# Patient Record
Sex: FEMALE | Race: NATIVE HAWAIIAN OR OTHER PACIFIC ISLANDER
[De-identification: names, ages, dates, MRNs, and addresses within clinical notes are randomized per-mention and may not be internally consistent; named-entity substitution may affect disease eponyms.]

---

## 2017-10-14 ENCOUNTER — HOSPITAL ENCOUNTER (INPATIENT)
Dept: HOSPITAL 67 - ED | Age: 72
LOS: 6 days | Discharge: NURSING, REHAB OR LONG-TERM CARE | DRG: 637 | End: 2017-10-20
Attending: GENERAL PRACTICE | Admitting: GENERAL PRACTICE
Payer: COMMERCIAL

## 2017-10-14 VITALS — DIASTOLIC BLOOD PRESSURE: 77 MMHG | SYSTOLIC BLOOD PRESSURE: 121 MMHG

## 2017-10-14 VITALS — TEMPERATURE: 98 F | SYSTOLIC BLOOD PRESSURE: 117 MMHG | DIASTOLIC BLOOD PRESSURE: 75 MMHG

## 2017-10-14 VITALS — DIASTOLIC BLOOD PRESSURE: 71 MMHG | SYSTOLIC BLOOD PRESSURE: 139 MMHG

## 2017-10-14 VITALS — DIASTOLIC BLOOD PRESSURE: 79 MMHG | SYSTOLIC BLOOD PRESSURE: 158 MMHG

## 2017-10-14 VITALS
WEIGHT: 82.56 LBS | HEIGHT: 60 IN | BODY MASS INDEX: 16.21 KG/M2 | BODY MASS INDEX: 16.21 KG/M2 | WEIGHT: 82.56 LBS | HEIGHT: 60 IN

## 2017-10-14 VITALS — DIASTOLIC BLOOD PRESSURE: 80 MMHG | TEMPERATURE: 97.8 F | SYSTOLIC BLOOD PRESSURE: 143 MMHG

## 2017-10-14 VITALS — SYSTOLIC BLOOD PRESSURE: 132 MMHG | TEMPERATURE: 98.7 F | DIASTOLIC BLOOD PRESSURE: 79 MMHG

## 2017-10-14 VITALS — SYSTOLIC BLOOD PRESSURE: 149 MMHG | DIASTOLIC BLOOD PRESSURE: 76 MMHG

## 2017-10-14 VITALS — SYSTOLIC BLOOD PRESSURE: 139 MMHG | DIASTOLIC BLOOD PRESSURE: 81 MMHG

## 2017-10-14 VITALS — DIASTOLIC BLOOD PRESSURE: 75 MMHG | SYSTOLIC BLOOD PRESSURE: 137 MMHG

## 2017-10-14 VITALS — DIASTOLIC BLOOD PRESSURE: 75 MMHG | SYSTOLIC BLOOD PRESSURE: 127 MMHG

## 2017-10-14 VITALS — DIASTOLIC BLOOD PRESSURE: 75 MMHG | SYSTOLIC BLOOD PRESSURE: 129 MMHG

## 2017-10-14 VITALS — DIASTOLIC BLOOD PRESSURE: 76 MMHG | TEMPERATURE: 97.7 F | SYSTOLIC BLOOD PRESSURE: 157 MMHG

## 2017-10-14 VITALS — DIASTOLIC BLOOD PRESSURE: 84 MMHG | SYSTOLIC BLOOD PRESSURE: 160 MMHG

## 2017-10-14 VITALS — DIASTOLIC BLOOD PRESSURE: 80 MMHG | SYSTOLIC BLOOD PRESSURE: 141 MMHG

## 2017-10-14 DIAGNOSIS — E11.10: Primary | ICD-10-CM

## 2017-10-14 DIAGNOSIS — E87.1: ICD-10-CM

## 2017-10-14 DIAGNOSIS — R06.09: ICD-10-CM

## 2017-10-14 DIAGNOSIS — D72.828: ICD-10-CM

## 2017-10-14 DIAGNOSIS — E87.2: ICD-10-CM

## 2017-10-14 DIAGNOSIS — Z72.0: ICD-10-CM

## 2017-10-14 DIAGNOSIS — E83.51: ICD-10-CM

## 2017-10-14 DIAGNOSIS — B96.1: ICD-10-CM

## 2017-10-14 DIAGNOSIS — N39.0: ICD-10-CM

## 2017-10-14 DIAGNOSIS — L89.893: ICD-10-CM

## 2017-10-14 DIAGNOSIS — F41.8: ICD-10-CM

## 2017-10-14 DIAGNOSIS — J44.9: ICD-10-CM

## 2017-10-14 DIAGNOSIS — I10: ICD-10-CM

## 2017-10-14 DIAGNOSIS — E83.42: ICD-10-CM

## 2017-10-14 LAB
PLATELET # BLD: 286 K/UL (ref 152–353)
POTASSIUM SERPL-SCNC: 3.7 MMOL/L (ref 3.6–5.2)
POTASSIUM SERPL-SCNC: 4.5 MMOL/L (ref 3.6–5.2)
POTASSIUM SERPL-SCNC: 4.7 MMOL/L (ref 3.6–5.2)
SODIUM SERPL-SCNC: 117 MMOL/L (ref 136–145)
SODIUM SERPL-SCNC: 121 MMOL/L (ref 136–145)
SODIUM SERPL-SCNC: 126 MMOL/L (ref 136–145)

## 2017-10-14 PROCEDURE — 96372 THER/PROPH/DIAG INJ SC/IM: CPT

## 2017-10-14 PROCEDURE — 80053 COMPREHEN METABOLIC PANEL: CPT

## 2017-10-14 PROCEDURE — 85379 FIBRIN DEGRADATION QUANT: CPT

## 2017-10-14 PROCEDURE — 82805 BLOOD GASES W/O2 SATURATION: CPT

## 2017-10-14 PROCEDURE — 85027 COMPLETE CBC AUTOMATED: CPT

## 2017-10-14 PROCEDURE — 81002 URINALYSIS NONAUTO W/O SCOPE: CPT

## 2017-10-14 PROCEDURE — 87205 SMEAR GRAM STAIN: CPT

## 2017-10-14 PROCEDURE — 82947 ASSAY GLUCOSE BLOOD QUANT: CPT

## 2017-10-14 PROCEDURE — 36416 COLLJ CAPILLARY BLOOD SPEC: CPT

## 2017-10-14 PROCEDURE — 87185 SC STD ENZYME DETCJ PER NZM: CPT

## 2017-10-14 PROCEDURE — 36600 WITHDRAWAL OF ARTERIAL BLOOD: CPT

## 2017-10-14 PROCEDURE — 82570 ASSAY OF URINE CREATININE: CPT

## 2017-10-14 PROCEDURE — 36415 COLL VENOUS BLD VENIPUNCTURE: CPT

## 2017-10-14 PROCEDURE — 84300 ASSAY OF URINE SODIUM: CPT

## 2017-10-14 PROCEDURE — 84481 FREE ASSAY (FT-3): CPT

## 2017-10-14 PROCEDURE — 87186 SC STD MICRODIL/AGAR DIL: CPT

## 2017-10-14 PROCEDURE — 87070 CULTURE OTHR SPECIMN AEROBIC: CPT

## 2017-10-14 PROCEDURE — 83605 ASSAY OF LACTIC ACID: CPT

## 2017-10-14 PROCEDURE — 84443 ASSAY THYROID STIM HORMONE: CPT

## 2017-10-14 PROCEDURE — 80048 BASIC METABOLIC PNL TOTAL CA: CPT

## 2017-10-14 PROCEDURE — 99282 EMERGENCY DEPT VISIT SF MDM: CPT

## 2017-10-14 PROCEDURE — 87088 URINE BACTERIA CULTURE: CPT

## 2017-10-14 PROCEDURE — 82962 GLUCOSE BLOOD TEST: CPT

## 2017-10-14 PROCEDURE — 83880 ASSAY OF NATRIURETIC PEPTIDE: CPT

## 2017-10-14 PROCEDURE — 81000 URINALYSIS NONAUTO W/SCOPE: CPT

## 2017-10-14 PROCEDURE — 82948 REAGENT STRIP/BLOOD GLUCOSE: CPT

## 2017-10-14 PROCEDURE — 87086 URINE CULTURE/COLONY COUNT: CPT

## 2017-10-14 PROCEDURE — 87077 CULTURE AEROBIC IDENTIFY: CPT

## 2017-10-14 PROCEDURE — 84439 ASSAY OF FREE THYROXINE: CPT

## 2017-10-14 PROCEDURE — 83735 ASSAY OF MAGNESIUM: CPT

## 2017-10-15 VITALS — DIASTOLIC BLOOD PRESSURE: 70 MMHG | SYSTOLIC BLOOD PRESSURE: 149 MMHG | TEMPERATURE: 97.1 F

## 2017-10-15 VITALS — DIASTOLIC BLOOD PRESSURE: 76 MMHG | SYSTOLIC BLOOD PRESSURE: 141 MMHG

## 2017-10-15 VITALS — DIASTOLIC BLOOD PRESSURE: 54 MMHG | SYSTOLIC BLOOD PRESSURE: 177 MMHG

## 2017-10-15 VITALS — TEMPERATURE: 97.8 F | DIASTOLIC BLOOD PRESSURE: 72 MMHG | SYSTOLIC BLOOD PRESSURE: 150 MMHG

## 2017-10-15 VITALS — SYSTOLIC BLOOD PRESSURE: 136 MMHG | DIASTOLIC BLOOD PRESSURE: 66 MMHG

## 2017-10-15 VITALS — SYSTOLIC BLOOD PRESSURE: 140 MMHG | DIASTOLIC BLOOD PRESSURE: 69 MMHG

## 2017-10-15 VITALS — SYSTOLIC BLOOD PRESSURE: 130 MMHG | DIASTOLIC BLOOD PRESSURE: 76 MMHG

## 2017-10-15 VITALS — DIASTOLIC BLOOD PRESSURE: 68 MMHG | TEMPERATURE: 98.9 F | SYSTOLIC BLOOD PRESSURE: 122 MMHG

## 2017-10-15 VITALS — TEMPERATURE: 96.3 F | DIASTOLIC BLOOD PRESSURE: 68 MMHG | SYSTOLIC BLOOD PRESSURE: 140 MMHG

## 2017-10-15 VITALS — SYSTOLIC BLOOD PRESSURE: 126 MMHG | DIASTOLIC BLOOD PRESSURE: 66 MMHG | TEMPERATURE: 96.9 F

## 2017-10-15 VITALS — SYSTOLIC BLOOD PRESSURE: 140 MMHG | DIASTOLIC BLOOD PRESSURE: 71 MMHG

## 2017-10-15 VITALS — DIASTOLIC BLOOD PRESSURE: 71 MMHG | SYSTOLIC BLOOD PRESSURE: 119 MMHG | TEMPERATURE: 98.3 F

## 2017-10-15 VITALS — DIASTOLIC BLOOD PRESSURE: 56 MMHG | SYSTOLIC BLOOD PRESSURE: 132 MMHG

## 2017-10-15 VITALS — DIASTOLIC BLOOD PRESSURE: 69 MMHG | SYSTOLIC BLOOD PRESSURE: 129 MMHG

## 2017-10-15 VITALS — SYSTOLIC BLOOD PRESSURE: 143 MMHG | DIASTOLIC BLOOD PRESSURE: 77 MMHG

## 2017-10-15 VITALS — DIASTOLIC BLOOD PRESSURE: 68 MMHG | SYSTOLIC BLOOD PRESSURE: 150 MMHG

## 2017-10-15 VITALS — SYSTOLIC BLOOD PRESSURE: 114 MMHG | DIASTOLIC BLOOD PRESSURE: 64 MMHG

## 2017-10-15 VITALS — SYSTOLIC BLOOD PRESSURE: 140 MMHG | DIASTOLIC BLOOD PRESSURE: 68 MMHG

## 2017-10-15 VITALS — SYSTOLIC BLOOD PRESSURE: 105 MMHG | DIASTOLIC BLOOD PRESSURE: 58 MMHG | TEMPERATURE: 96.9 F

## 2017-10-15 VITALS — DIASTOLIC BLOOD PRESSURE: 72 MMHG | SYSTOLIC BLOOD PRESSURE: 152 MMHG

## 2017-10-15 VITALS — SYSTOLIC BLOOD PRESSURE: 142 MMHG | DIASTOLIC BLOOD PRESSURE: 69 MMHG

## 2017-10-15 VITALS — SYSTOLIC BLOOD PRESSURE: 140 MMHG | DIASTOLIC BLOOD PRESSURE: 88 MMHG

## 2017-10-15 VITALS — SYSTOLIC BLOOD PRESSURE: 139 MMHG | DIASTOLIC BLOOD PRESSURE: 65 MMHG

## 2017-10-15 VITALS — DIASTOLIC BLOOD PRESSURE: 66 MMHG | SYSTOLIC BLOOD PRESSURE: 119 MMHG

## 2017-10-15 LAB
PLATELET # BLD: 311 K/UL (ref 152–353)
POTASSIUM SERPL-SCNC: 3.5 MMOL/L (ref 3.6–5.2)
POTASSIUM SERPL-SCNC: 3.7 MMOL/L (ref 3.6–5.2)
POTASSIUM SERPL-SCNC: 3.7 MMOL/L (ref 3.6–5.2)
SODIUM SERPL-SCNC: 127 MMOL/L (ref 136–145)
SODIUM SERPL-SCNC: 128 MMOL/L (ref 136–145)
SODIUM SERPL-SCNC: 129 MMOL/L (ref 136–145)

## 2017-10-16 VITALS — DIASTOLIC BLOOD PRESSURE: 85 MMHG | SYSTOLIC BLOOD PRESSURE: 176 MMHG

## 2017-10-16 VITALS — TEMPERATURE: 98.1 F | DIASTOLIC BLOOD PRESSURE: 73 MMHG | SYSTOLIC BLOOD PRESSURE: 157 MMHG

## 2017-10-16 VITALS — SYSTOLIC BLOOD PRESSURE: 166 MMHG | DIASTOLIC BLOOD PRESSURE: 86 MMHG | TEMPERATURE: 98 F

## 2017-10-16 VITALS — TEMPERATURE: 98.3 F | DIASTOLIC BLOOD PRESSURE: 75 MMHG | SYSTOLIC BLOOD PRESSURE: 155 MMHG

## 2017-10-16 VITALS — DIASTOLIC BLOOD PRESSURE: 78 MMHG | SYSTOLIC BLOOD PRESSURE: 170 MMHG

## 2017-10-16 VITALS — DIASTOLIC BLOOD PRESSURE: 74 MMHG | SYSTOLIC BLOOD PRESSURE: 155 MMHG

## 2017-10-16 VITALS — DIASTOLIC BLOOD PRESSURE: 95 MMHG | SYSTOLIC BLOOD PRESSURE: 181 MMHG | TEMPERATURE: 97.9 F

## 2017-10-16 VITALS — DIASTOLIC BLOOD PRESSURE: 81 MMHG | SYSTOLIC BLOOD PRESSURE: 167 MMHG

## 2017-10-16 VITALS — SYSTOLIC BLOOD PRESSURE: 157 MMHG | DIASTOLIC BLOOD PRESSURE: 76 MMHG | TEMPERATURE: 97.6 F

## 2017-10-16 VITALS — SYSTOLIC BLOOD PRESSURE: 156 MMHG | DIASTOLIC BLOOD PRESSURE: 72 MMHG

## 2017-10-16 LAB
PLATELET # BLD: 188 K/UL (ref 152–353)
POTASSIUM SERPL-SCNC: 3.1 MMOL/L (ref 3.6–5.2)
SODIUM SERPL-SCNC: 130 MMOL/L (ref 136–145)

## 2017-10-17 VITALS — TEMPERATURE: 97.8 F | DIASTOLIC BLOOD PRESSURE: 64 MMHG | SYSTOLIC BLOOD PRESSURE: 133 MMHG

## 2017-10-17 VITALS — DIASTOLIC BLOOD PRESSURE: 85 MMHG | SYSTOLIC BLOOD PRESSURE: 172 MMHG | TEMPERATURE: 98 F

## 2017-10-17 VITALS — DIASTOLIC BLOOD PRESSURE: 58 MMHG | TEMPERATURE: 97.8 F | SYSTOLIC BLOOD PRESSURE: 152 MMHG

## 2017-10-17 LAB
PLATELET # BLD: 270 K/UL (ref 152–353)
POTASSIUM SERPL-SCNC: 3.1 MMOL/L (ref 3.6–5.2)
SODIUM SERPL-SCNC: 128 MMOL/L (ref 136–145)

## 2017-10-18 VITALS — TEMPERATURE: 98 F | SYSTOLIC BLOOD PRESSURE: 140 MMHG | DIASTOLIC BLOOD PRESSURE: 71 MMHG

## 2017-10-18 VITALS — TEMPERATURE: 98.7 F | DIASTOLIC BLOOD PRESSURE: 61 MMHG | SYSTOLIC BLOOD PRESSURE: 132 MMHG

## 2017-10-18 LAB
PLATELET # BLD: 267 K/UL (ref 152–353)
POTASSIUM SERPL-SCNC: 3.5 MMOL/L (ref 3.6–5.2)
SODIUM SERPL-SCNC: 131 MMOL/L (ref 136–145)

## 2017-10-19 VITALS — TEMPERATURE: 98.9 F | SYSTOLIC BLOOD PRESSURE: 146 MMHG | DIASTOLIC BLOOD PRESSURE: 73 MMHG

## 2017-10-19 VITALS — TEMPERATURE: 99.2 F | DIASTOLIC BLOOD PRESSURE: 65 MMHG | SYSTOLIC BLOOD PRESSURE: 129 MMHG

## 2017-10-19 VITALS — TEMPERATURE: 98.4 F | SYSTOLIC BLOOD PRESSURE: 146 MMHG | DIASTOLIC BLOOD PRESSURE: 74 MMHG

## 2017-10-19 VITALS — TEMPERATURE: 98.7 F | SYSTOLIC BLOOD PRESSURE: 146 MMHG | DIASTOLIC BLOOD PRESSURE: 66 MMHG

## 2017-10-19 VITALS — TEMPERATURE: 99 F | SYSTOLIC BLOOD PRESSURE: 142 MMHG | DIASTOLIC BLOOD PRESSURE: 66 MMHG

## 2017-10-19 VITALS — DIASTOLIC BLOOD PRESSURE: 65 MMHG | TEMPERATURE: 98.9 F | SYSTOLIC BLOOD PRESSURE: 139 MMHG

## 2017-10-19 LAB
PLATELET # BLD: 231 K/UL (ref 152–353)
POTASSIUM SERPL-SCNC: 3.9 MMOL/L (ref 3.6–5.2)
SODIUM SERPL-SCNC: 132 MMOL/L (ref 136–145)

## 2017-10-20 VITALS — SYSTOLIC BLOOD PRESSURE: 128 MMHG | TEMPERATURE: 98.5 F | DIASTOLIC BLOOD PRESSURE: 63 MMHG

## 2017-10-20 VITALS — SYSTOLIC BLOOD PRESSURE: 151 MMHG | DIASTOLIC BLOOD PRESSURE: 82 MMHG | TEMPERATURE: 98.7 F

## 2017-10-20 VITALS — SYSTOLIC BLOOD PRESSURE: 129 MMHG | DIASTOLIC BLOOD PRESSURE: 73 MMHG | TEMPERATURE: 99.4 F

## 2017-10-20 LAB
PLATELET # BLD: 220 K/UL (ref 152–353)
POTASSIUM SERPL-SCNC: 3.6 MMOL/L (ref 3.6–5.2)
SODIUM SERPL-SCNC: 133 MMOL/L (ref 136–145)

## 2017-10-29 ENCOUNTER — HOSPITAL ENCOUNTER (INPATIENT)
Dept: HOSPITAL 24 - ER | Age: 72
LOS: 3 days | Discharge: NURSING, REHAB OR LONG-TERM CARE | DRG: 947 | End: 2017-11-01
Attending: INTERNAL MEDICINE | Admitting: INTERNAL MEDICINE
Payer: COMMERCIAL

## 2017-10-29 VITALS — BODY MASS INDEX: 13.2 KG/M2

## 2017-10-29 DIAGNOSIS — N39.0: ICD-10-CM

## 2017-10-29 DIAGNOSIS — I25.10: ICD-10-CM

## 2017-10-29 DIAGNOSIS — E87.5: ICD-10-CM

## 2017-10-29 DIAGNOSIS — F41.8: ICD-10-CM

## 2017-10-29 DIAGNOSIS — G93.41: ICD-10-CM

## 2017-10-29 DIAGNOSIS — K21.9: ICD-10-CM

## 2017-10-29 DIAGNOSIS — R41.82: Primary | ICD-10-CM

## 2017-10-29 DIAGNOSIS — J44.9: ICD-10-CM

## 2017-10-29 DIAGNOSIS — N17.8: ICD-10-CM

## 2017-10-29 DIAGNOSIS — R94.31: ICD-10-CM

## 2017-10-29 DIAGNOSIS — E87.1: ICD-10-CM

## 2017-10-29 DIAGNOSIS — R53.1: ICD-10-CM

## 2017-10-29 DIAGNOSIS — E86.0: ICD-10-CM

## 2017-10-29 DIAGNOSIS — E11.65: ICD-10-CM

## 2017-10-29 DIAGNOSIS — I10: ICD-10-CM

## 2017-10-29 LAB
ALBUMIN SERPL BCP-MCNC: 3.4 G/DL (ref 3.4–5)
ALP SERPL-CCNC: 133 UNITS/L (ref 46–116)
ALT SERPL W P-5'-P-CCNC: 26 UNITS/L (ref 12–78)
AMORPH SED URNS QL MICRO: (no result) /HPF
AST SERPL W P-5'-P-CCNC: 32 UNITS/L (ref 15–37)
BACTERIA URNS QL MICRO: (no result) /HPF
BASOPHILS # BLD AUTO: 0.2 X10^3/UL (ref 0–0.1)
BASOPHILS NFR BLD AUTO: 1.2 % (ref 0.2–1)
BILIRUB UR QL STRIP.AUTO: NEGATIVE
BUN SERPL-MCNC: 56 MG/DL (ref 7–18)
BUN SERPL-MCNC: 58 MG/DL (ref 7–18)
CALCIUM ALBUM COR SERPL-SCNC: (no result) MG/DL (ref 8.5–10.1)
CALCIUM ALBUM COR SERPL-SCNC: (no result) MMOL/L (ref 136–145)
CALCIUM ALBUM COR SERPL-SCNC: (no result) MMOL/L (ref 136–145)
CALCIUM SERPL-MCNC: 10 MG/DL (ref 8.5–10.1)
CALCIUM SERPL-MCNC: 10.1 MG/DL (ref 8.5–10.1)
CHLORIDE SERPL-SCNC: 102 MMOL/L (ref 98–107)
CHLORIDE SERPL-SCNC: 103 MMOL/L (ref 98–107)
CLARITY UR: (no result)
CO2 SERPL-SCNC: 14 MMOL/L (ref 21–32)
CO2 SERPL-SCNC: 15.6 MMOL/L (ref 21–32)
COLOR UR AUTO: YELLOW
CREAT SERPL-MCNC: 3.23 MG/DL (ref 0.55–1.02)
CREAT SERPL-MCNC: 3.33 MG/DL (ref 0.55–1.02)
EGFR  BLACK RACES: 18 (ref 60–?)
EGFR  BLACK RACES: 18 (ref 60–?)
EOSINOPHIL # BLD AUTO: 0 X10^3/UL (ref 0–0.2)
EOSINOPHIL NFR BLD AUTO: 0.2 % (ref 0.9–2.9)
ERYTHROCYTE [DISTWIDTH] IN BLOOD BY AUTOMATED COUNT: 14.7 % (ref 11.6–16.5)
GLUCOSE UR QL STRIP.AUTO: (no result)
HCT VFR BLD AUTO: 39.9 % (ref 36–47)
HGB BLD-MCNC: 13.5 G/DL (ref 12–16)
KETONES UR QL STRIP.AUTO: NEGATIVE
LEUKOCYTE ESTERASE UR QL STRIP.AUTO: (no result)
LYMPHOCYTES # BLD AUTO: 1 X10^3/UL (ref 1.3–2.9)
LYMPHOCYTES NFR BLD AUTO: 6.5 % (ref 21–51)
MCH RBC QN AUTO: 31.7 PG (ref 27–34)
MCHC RBC AUTO-ENTMCNC: 33.9 G/DL (ref 33–35)
MCV RBC AUTO: 93.4 FL (ref 80–100)
MONOCYTES # BLD AUTO: 0.7 X10^3/UL (ref 0.3–0.8)
MONOCYTES NFR BLD AUTO: 4.9 % (ref 0–13)
NEUTROPHILS # BLD AUTO: 12.9 X10^3/UL (ref 2.2–4.8)
NEUTROPHILS NFR BLD AUTO: 87.2 % (ref 42–75)
NITRITE UR QL STRIP.AUTO: NEGATIVE
PH UR STRIP.AUTO: 5 [PH] (ref 5–8)
PLATELET # BLD: 387 X10^3/UL (ref 150–450)
PMV BLD AUTO: 8.7 FL (ref 7.4–11)
PROT SERPL-MCNC: 8.3 G/DL (ref 6.4–8.2)
PROT UR QL STRIP.AUTO: (no result)
RBC # BLD AUTO: 4.27 X10^6/UL (ref 3.5–5.4)
RBC # UR STRIP.AUTO: (no result) /UL
SODIUM SERPL-SCNC: 123 MMOL/L (ref 136–145)
SODIUM SERPL-SCNC: 124 MMOL/L (ref 136–145)
SP GR UR STRIP.AUTO: 1.01 (ref 1–1.03)
SQUAMOUS URNS QL MICRO: (no result) /HPF
UROBILINOGEN UR QL STRIP.AUTO: NORMAL
WBC NRBC COR # BLD AUTO: 14.8 X10^3/UL (ref 3.6–10)
YEAST URNS QL MICRO: (no result) /HPF

## 2017-10-29 PROCEDURE — 96374 THER/PROPH/DIAG INJ IV PUSH: CPT

## 2017-10-29 PROCEDURE — 80053 COMPREHEN METABOLIC PANEL: CPT

## 2017-10-29 PROCEDURE — 99221 1ST HOSP IP/OBS SF/LOW 40: CPT

## 2017-10-29 PROCEDURE — 87040 BLOOD CULTURE FOR BACTERIA: CPT

## 2017-10-29 PROCEDURE — 93005 ELECTROCARDIOGRAM TRACING: CPT

## 2017-10-29 PROCEDURE — 85025 COMPLETE CBC W/AUTO DIFF WBC: CPT

## 2017-10-29 PROCEDURE — 51702 INSERT TEMP BLADDER CATH: CPT

## 2017-10-29 PROCEDURE — 84132 ASSAY OF SERUM POTASSIUM: CPT

## 2017-10-29 PROCEDURE — 36415 COLL VENOUS BLD VENIPUNCTURE: CPT

## 2017-10-29 PROCEDURE — 80200 ASSAY OF TOBRAMYCIN: CPT

## 2017-10-29 PROCEDURE — 96365 THER/PROPH/DIAG IV INF INIT: CPT

## 2017-10-29 PROCEDURE — 82947 ASSAY GLUCOSE BLOOD QUANT: CPT

## 2017-10-29 PROCEDURE — 96375 TX/PRO/DX INJ NEW DRUG ADDON: CPT

## 2017-10-29 PROCEDURE — 87086 URINE CULTURE/COLONY COUNT: CPT

## 2017-10-29 PROCEDURE — 83605 ASSAY OF LACTIC ACID: CPT

## 2017-10-29 PROCEDURE — 93010 ELECTROCARDIOGRAM REPORT: CPT

## 2017-10-29 PROCEDURE — 99285 EMERGENCY DEPT VISIT HI MDM: CPT

## 2017-10-29 PROCEDURE — 99284 EMERGENCY DEPT VISIT MOD MDM: CPT

## 2017-10-29 PROCEDURE — 94640 AIRWAY INHALATION TREATMENT: CPT

## 2017-10-29 PROCEDURE — 71010: CPT

## 2017-10-29 PROCEDURE — 82803 BLOOD GASES ANY COMBINATION: CPT

## 2017-10-29 PROCEDURE — 71020: CPT

## 2017-10-29 PROCEDURE — 82565 ASSAY OF CREATININE: CPT

## 2017-10-29 PROCEDURE — 80048 BASIC METABOLIC PNL TOTAL CA: CPT

## 2017-10-29 PROCEDURE — 36600 WITHDRAWAL OF ARTERIAL BLOOD: CPT

## 2017-10-29 PROCEDURE — 81001 URINALYSIS AUTO W/SCOPE: CPT

## 2017-10-29 RX ADMIN — SODIUM POLYSTYRENE SULFONATE SCH GM: 15 SUSPENSION ORAL; RECTAL at 23:49

## 2017-10-29 RX ADMIN — LEVALBUTEROL HYDROCHLORIDE SCH EA: 1.25 SOLUTION RESPIRATORY (INHALATION) at 20:18

## 2017-10-29 RX ADMIN — SODIUM CHLORIDE SCH: 9 INJECTION, SOLUTION INTRAVENOUS at 14:50

## 2017-10-29 RX ADMIN — TOBRAMYCIN SULFATE SCH MLS/HR: 40 INJECTION, SOLUTION INTRAMUSCULAR; INTRAVENOUS at 18:30

## 2017-10-29 RX ADMIN — SODIUM POLYSTYRENE SULFONATE SCH GM: 15 SUSPENSION ORAL; RECTAL at 18:06

## 2017-10-29 RX ADMIN — BUDESONIDE SCH EA: 0.5 INHALANT RESPIRATORY (INHALATION) at 20:18

## 2017-10-29 RX ADMIN — SODIUM CHLORIDE SCH MLS/HR: 9 INJECTION, SOLUTION INTRAVENOUS at 23:49

## 2017-10-29 RX ADMIN — SODIUM CHLORIDE SCH: 9 INJECTION, SOLUTION INTRAVENOUS at 21:07

## 2017-10-29 NOTE — DR.GENAD
HPI





- PCP


Primary Care Physician: SIENNA





- HPI Comment


HPI Comment: Generalised weakness. Seen at the E.D in [facility] last night for 

same. She was noted with electrolyte abnormalities. She was d/c back to the NH 

after infusion of fluids. NH staff called here this morning and requested she 

be seen here





- Complaint/Symptoms


Chief Complaint:: CARLIE BURGOS RN, FROM [facility] CALLS AND STATES 

PT. WAS SENT TO [facility] ER LAST NIGHT WITH A POTASSIUM OF 6.0. PT. WAS GIVEN 

IVF'S AND SENT BACK TO NURSING HOME WITH ORDERS TO ADMINISTER KAEXYLATE. NURSE 

STATES PT. HAS JERKING SENSTATIONS TO HER EXTREMITIES, GENERALIZED WEAKNESS, AMS

, A-FIB WITH A RATE BETWEEN  BPM, HYPOXIA & SHALLOW RESPIRATIONS. O2 WAS 

62% ON ROOM AIR. UPON ARRIVAL TO ER, PT. IS LETHARGIC AND C/O LOWER BACK PAIN.





- Nurses notes reviewed


Nurses Notes Review: Yes





- Source


History Provided: EMS, Nursing Home





- Mode of Arrival


Mode of Arrival: EMS





- Timing


Onset of Chief Complaint: 10/28/17





- Modifying Factors


Worsens:: nothing


Improves:: nothing





PMH





- PMH


Past Medical History: Yes


Past Medical History: Anxiety, COPD, Coronary Artery Disease, Depression, 

Diabetes, GERD, Hypertension, MI


Past Medical History Comment: ANOREXIA


Past Surgical History: Yes


Surgical History: Bowel Resection, CABG/Valve Surgery


Past Surgical History Comment: Colostomy





- Family History


History of Family Medical Conditions: Yes


Family Medical History: Diabetes Mellitus, Cancer, Coronary Artery Disease





- Social History


Does patient currently use any type of tobacco product: No


Have you used tobacco products in the last 12 months: No


Type of Tobacco Use: None


Does any household member use tobacco: No


Alcohol Use: None


Do you use any recreational Drugs:: No


Lives Where: Nursing Home





- infectious screening


In the last 2 months have you had wt loss of >10#?: NO


Have you had fever, night sweats or hemotysis?: No


Have you traveled outside the country in the last 6 months?: No


Isolation: Standard





ROS





- Review of Systems


Constitutional: Weakness


Eyes: No Symptoms Reported


ENTM: No Symptoms Reported


Respiratoy: No Symptoms Reported


Cardiovascular: No Symptoms Reported


Gastrointestinal/Abdominal: No Symptoms Reported


Genitourinary: No Symptoms Reported


Neurological: No Symptoms Reported


Musculoskeletal: No Symptoms Reported


Integumentary: No Symptoms Reported


Hematologic/Lymphatic: No Symptoms Reported


Endocrine: No Symptoms Reported


Psychiatric: No Symptoms Reported


All Other Systems: Reviewed and Negative





PE





- Vital Signs


Vitals: 





 





Temperature                      98.7 F


Pulse Rate [Apical]              84


Pulse Rate                       86


Respiratory Rate                 13


Blood Pressure [Right Arm]       137/51


Blood Pressure [Left Arm]        114/57


Blood Pressure                   118/57


O2 Sat by Pulse Oximetry         100











- General


Limitations: No Limitations


General Appearance: Alert, Lethargic, Cachectic





- Head


Head Exam: Normal Inspection





- Eyes


Eye exam: Normal Appearance, PERRL, EOMI





- ENT


ENT Exam: Normal Exam, Mucous Membranes Dry


External Ear Exam: Normal External Inspection


TM/Canal Exam: Bilateral Normal


Nose Exam: Normal Nose Exam


Mouth Exam: Other (Dry tongue and lips)





- Neck


Neck Exam: Normal Inspection





- Chest


Chest Inspection: Normal Inspection





- Respiratory


Respiratory Exam: Normal Lung Sounds Bilat


Respiratory Exam: Bilateral Clear to Auscultation





- Cardiovascular


Cardiovascular Exam: Regular Rate, Normal Rhythm





- Abdominal Exam


Abdominal Exam: Normal Inspection, Normal Bowel Sounds, Soft, Other (Right 

sided colostomy bag)





- Extremities


Extremities Exam: Normal Inspection





- Back


Back Exam: Normal Inspection





- Neurologic


Neurological Exam: Other (oriented to self)





- Psychiatric


Psychiatric Exam: Normal Affect, Normal Mood





- Skin


Skin Exam: Warm, Dry, Intact, Normal Color





MDM





- Additional Information


Additional Information Obtained From: Old Records (from [facility] 

relating to last night's E.D. visit.)





Course





- Education/Counseling


Education/Counseling: Patient, Family


Educated On: Treatment, Diagnosis





ROR





- Labs Reviewed


Result Diagrams: 


 10/29/17 09:25





 10/29/17 10:43





- XRAY


XRAY Interpreted by: Self (hyperinflated lung fields. no gross infiltrates. no 

pneumothorax. no cardiomegaly. )





- Diagnosis


Discharge Problem: 


 Weakness generalized, UTI (urinary tract infection) with pyuria, Hyperkalemia, 

Hyponatremia, Dehydration, Leukocytosis








- Discharge Plan


Disposition: 09 ADMITTED AS INPATIENT


Condition: Stable





- Follow ups/Referrals





- Instructions

## 2017-10-30 LAB
ALBUMIN SERPL BCP-MCNC: 2.4 G/DL (ref 3.4–5)
ALP SERPL-CCNC: 160 UNITS/L (ref 46–116)
ALT SERPL W P-5'-P-CCNC: 35 UNITS/L (ref 12–78)
AST SERPL W P-5'-P-CCNC: 34 UNITS/L (ref 15–37)
BASOPHILS # BLD AUTO: 0.1 X10^3/UL (ref 0–0.1)
BASOPHILS NFR BLD AUTO: 0.7 % (ref 0.2–1)
BUN SERPL-MCNC: 51 MG/DL (ref 7–18)
CALCIUM ALBUM COR SERPL-SCNC: 10.6 MG/DL (ref 8.5–10.1)
CALCIUM ALBUM COR SERPL-SCNC: 139 MMOL/L (ref 136–145)
CALCIUM SERPL-MCNC: 9.3 MG/DL (ref 8.5–10.1)
CHLORIDE SERPL-SCNC: 104 MMOL/L (ref 98–107)
CO2 SERPL-SCNC: 13.4 MMOL/L (ref 21–32)
CREAT SERPL-MCNC: 1.46 MG/DL (ref 0.55–1.02)
CREAT SERPL-MCNC: 2.13 MG/DL (ref 0.55–1.02)
EGFR  BLACK RACES: 29 (ref 60–?)
EOSINOPHIL # BLD AUTO: 0 X10^3/UL (ref 0–0.2)
EOSINOPHIL NFR BLD AUTO: 0.4 % (ref 0.9–2.9)
ERYTHROCYTE [DISTWIDTH] IN BLOOD BY AUTOMATED COUNT: 15 % (ref 11.6–16.5)
HCT VFR BLD AUTO: 31.7 % (ref 36–47)
HGB BLD-MCNC: 10.6 G/DL (ref 12–16)
LACTATE SERPL-SCNC: 1.9 MMOL/L (ref 0.4–2)
LYMPHOCYTES # BLD AUTO: 0.7 X10^3/UL (ref 1.3–2.9)
LYMPHOCYTES NFR BLD AUTO: 8.8 % (ref 21–51)
MCH RBC QN AUTO: 32.4 PG (ref 27–34)
MCHC RBC AUTO-ENTMCNC: 33.5 G/DL (ref 33–35)
MCV RBC AUTO: 96.6 FL (ref 80–100)
MONOCYTES # BLD AUTO: 0.5 X10^3/UL (ref 0.3–0.8)
MONOCYTES NFR BLD AUTO: 6.6 % (ref 0–13)
NEUTROPHILS # BLD AUTO: 6.5 X10^3/UL (ref 2.2–4.8)
NEUTROPHILS NFR BLD AUTO: 83.5 % (ref 42–75)
PLATELET # BLD: 266 X10^3/UL (ref 150–450)
PMV BLD AUTO: 8.9 FL (ref 7.4–11)
PROT SERPL-MCNC: 7 G/DL (ref 6.4–8.2)
RBC # BLD AUTO: 3.28 X10^6/UL (ref 3.5–5.4)
SODIUM SERPL-SCNC: 132 MMOL/L (ref 136–145)
TOBRAMYCIN TROUGH SERPL-MCNC: 2.8 UG/ML (ref 0–2)
WBC NRBC COR # BLD AUTO: 7.8 X10^3/UL (ref 3.6–10)

## 2017-10-30 RX ADMIN — BUDESONIDE SCH EA: 0.5 INHALANT RESPIRATORY (INHALATION) at 08:19

## 2017-10-30 RX ADMIN — SODIUM CHLORIDE SCH MLS/HR: 9 INJECTION, SOLUTION INTRAVENOUS at 22:55

## 2017-10-30 RX ADMIN — TOBRAMYCIN SULFATE SCH MLS/HR: 40 INJECTION, SOLUTION INTRAMUSCULAR; INTRAVENOUS at 20:45

## 2017-10-30 RX ADMIN — LEVALBUTEROL HYDROCHLORIDE SCH EA: 1.25 SOLUTION RESPIRATORY (INHALATION) at 11:26

## 2017-10-30 RX ADMIN — LEVALBUTEROL HYDROCHLORIDE SCH EA: 1.25 SOLUTION RESPIRATORY (INHALATION) at 21:27

## 2017-10-30 RX ADMIN — ACETAMINOPHEN AND CODEINE PHOSPHATE PRN TAB: 300; 30 TABLET ORAL at 04:43

## 2017-10-30 RX ADMIN — LEVALBUTEROL HYDROCHLORIDE SCH EA: 1.25 SOLUTION RESPIRATORY (INHALATION) at 08:19

## 2017-10-30 RX ADMIN — SODIUM POLYSTYRENE SULFONATE SCH GM: 15 SUSPENSION ORAL; RECTAL at 05:06

## 2017-10-30 RX ADMIN — BUDESONIDE SCH EA: 0.5 INHALANT RESPIRATORY (INHALATION) at 21:27

## 2017-10-30 RX ADMIN — SODIUM CHLORIDE SCH MLS/HR: 9 INJECTION, SOLUTION INTRAVENOUS at 05:07

## 2017-10-30 RX ADMIN — SODIUM POLYSTYRENE SULFONATE SCH GM: 15 SUSPENSION ORAL; RECTAL at 12:05

## 2017-10-30 RX ADMIN — LEVALBUTEROL HYDROCHLORIDE SCH EA: 1.25 SOLUTION RESPIRATORY (INHALATION) at 16:21

## 2017-10-30 RX ADMIN — TOBRAMYCIN SULFATE SCH: 40 INJECTION, SOLUTION INTRAMUSCULAR; INTRAVENOUS at 23:42

## 2017-10-30 RX ADMIN — ACETAMINOPHEN AND CODEINE PHOSPHATE PRN TAB: 300; 30 TABLET ORAL at 12:17

## 2017-10-30 RX ADMIN — LEVALBUTEROL HYDROCHLORIDE SCH: 1.25 SOLUTION RESPIRATORY (INHALATION) at 16:21

## 2017-10-30 RX ADMIN — TOBRAMYCIN SULFATE SCH MLS/HR: 40 INJECTION, SOLUTION INTRAMUSCULAR; INTRAVENOUS at 09:43

## 2017-10-30 RX ADMIN — TOBRAMYCIN SULFATE SCH MLS/HR: 40 INJECTION, SOLUTION INTRAMUSCULAR; INTRAVENOUS at 00:30

## 2017-10-30 RX ADMIN — CEFTRIAXONE SCH MLS/HR: 1 INJECTION, POWDER, FOR SOLUTION INTRAMUSCULAR; INTRAVENOUS at 12:20

## 2017-10-31 LAB
ALBUMIN SERPL BCP-MCNC: 2.5 G/DL (ref 3.4–5)
ALP SERPL-CCNC: 128 UNITS/L (ref 46–116)
ALT SERPL W P-5'-P-CCNC: 27 UNITS/L (ref 12–78)
ARTERIAL PATENCY WRIST A: (no result)
AST SERPL W P-5'-P-CCNC: 29 UNITS/L (ref 15–37)
BASE EXCESS BLDA CALC-SCNC: -12.3 MMOL/L (ref -2–2)
BASOPHILS # BLD AUTO: 0.1 X10^3/UL (ref 0–0.1)
BASOPHILS NFR BLD AUTO: 0.9 % (ref 0.2–1)
BUN SERPL-MCNC: 26 MG/DL (ref 7–18)
CALCIUM ALBUM COR SERPL-SCNC: 10 MG/DL (ref 8.5–10.1)
CALCIUM ALBUM COR SERPL-SCNC: 133 MMOL/L (ref 136–145)
CALCIUM SERPL-MCNC: 8.8 MG/DL (ref 8.5–10.1)
CHLORIDE SERPL-SCNC: 105 MMOL/L (ref 98–107)
CO2 SERPL-SCNC: 13.3 MMOL/L (ref 21–32)
CREAT SERPL-MCNC: 1.27 MG/DL (ref 0.55–1.02)
EGFR  BLACK RACES: 54 (ref 60–?)
EOSINOPHIL # BLD AUTO: 0.1 X10^3/UL (ref 0–0.2)
EOSINOPHIL NFR BLD AUTO: 1.8 % (ref 0.9–2.9)
ERYTHROCYTE [DISTWIDTH] IN BLOOD BY AUTOMATED COUNT: 14.8 % (ref 11.6–16.5)
FRACTIONATED INSPIRED OXYGEN: 21
HCO3 BLDA-SCNC: 11.9 MMOL/L (ref 22–26)
HCT VFR BLD AUTO: 31.9 % (ref 36–47)
HGB BLD-MCNC: 10.7 G/DL (ref 12–16)
LYMPHOCYTES # BLD AUTO: 1.1 X10^3/UL (ref 1.3–2.9)
LYMPHOCYTES NFR BLD AUTO: 16.2 % (ref 21–51)
MCH RBC QN AUTO: 31.8 PG (ref 27–34)
MCHC RBC AUTO-ENTMCNC: 33.4 G/DL (ref 33–35)
MCV RBC AUTO: 95.2 FL (ref 80–100)
MONOCYTES # BLD AUTO: 0.6 X10^3/UL (ref 0.3–0.8)
MONOCYTES NFR BLD AUTO: 8.2 % (ref 0–13)
NEUTROPHILS # BLD AUTO: 5.1 X10^3/UL (ref 2.2–4.8)
NEUTROPHILS NFR BLD AUTO: 72.9 % (ref 42–75)
PCO2 BLDA: 23 MMHG (ref 35–45)
PH BLDA: 7.32 [PH] (ref 7.35–7.45)
PLATELET # BLD: 259 X10^3/UL (ref 150–450)
PMV BLD AUTO: 8.7 FL (ref 7.4–11)
PO2 BLDA: 100 MMHG (ref 80–100)
PROT SERPL-MCNC: 7 G/DL (ref 6.4–8.2)
RBC # BLD AUTO: 3.35 X10^6/UL (ref 3.5–5.4)
SAO2 % BLDA: 97 % (ref 90–100)
SODIUM SERPL-SCNC: 131 MMOL/L (ref 136–145)
WBC NRBC COR # BLD AUTO: 7 X10^3/UL (ref 3.6–10)

## 2017-10-31 RX ADMIN — SODIUM CHLORIDE SCH: 9 INJECTION, SOLUTION INTRAVENOUS at 06:25

## 2017-10-31 RX ADMIN — LEVALBUTEROL HYDROCHLORIDE SCH EA: 1.25 SOLUTION RESPIRATORY (INHALATION) at 16:36

## 2017-10-31 RX ADMIN — SODIUM CHLORIDE SCH MLS/HR: 9 INJECTION, SOLUTION INTRAVENOUS at 08:24

## 2017-10-31 RX ADMIN — BUDESONIDE SCH EA: 0.5 INHALANT RESPIRATORY (INHALATION) at 08:56

## 2017-10-31 RX ADMIN — CEFTRIAXONE SCH MLS/HR: 1 INJECTION, POWDER, FOR SOLUTION INTRAMUSCULAR; INTRAVENOUS at 08:24

## 2017-10-31 RX ADMIN — LEVALBUTEROL HYDROCHLORIDE SCH EA: 1.25 SOLUTION RESPIRATORY (INHALATION) at 12:11

## 2017-10-31 RX ADMIN — LEVALBUTEROL HYDROCHLORIDE SCH EA: 1.25 SOLUTION RESPIRATORY (INHALATION) at 08:56

## 2017-10-31 RX ADMIN — BUDESONIDE SCH EA: 0.5 INHALANT RESPIRATORY (INHALATION) at 20:27

## 2017-10-31 RX ADMIN — Medication SCH MG: at 21:01

## 2017-10-31 RX ADMIN — LEVALBUTEROL HYDROCHLORIDE SCH EA: 1.25 SOLUTION RESPIRATORY (INHALATION) at 20:27

## 2017-10-31 RX ADMIN — ACETAMINOPHEN AND CODEINE PHOSPHATE PRN TAB: 300; 30 TABLET ORAL at 00:35

## 2017-10-31 RX ADMIN — SODIUM CHLORIDE SCH: 9 INJECTION, SOLUTION INTRAVENOUS at 15:57

## 2017-10-31 NOTE — RAD
HISTORY:  Shortness of breath



Study:  Chest PA and lateral



Comparison: 10/29/2017







Findings:



The patient is status post median sternotomy and CABG. The heart is within normal limits in size. The
 lakshmi are normal. The lungs are mildly hyperinflated but free of acute alveolar infiltrates. There ha
s been interval development of a left pleural effusion since the prior examination.



IMPRESSION:

 

Interval development of a small left pleural effusion since the prior examination



Hyperinflation consistent with COPD



No infiltrates







Reported By:Electronically Signed by GRETCHEN WEBSTER MD at 10/31/2017 10:50:38 AM

## 2017-11-01 VITALS — SYSTOLIC BLOOD PRESSURE: 151 MMHG | DIASTOLIC BLOOD PRESSURE: 66 MMHG

## 2017-11-01 LAB
ALBUMIN SERPL BCP-MCNC: 2.2 G/DL (ref 3.4–5)
ALP SERPL-CCNC: 104 UNITS/L (ref 46–116)
ALT SERPL W P-5'-P-CCNC: 24 UNITS/L (ref 12–78)
AST SERPL W P-5'-P-CCNC: 24 UNITS/L (ref 15–37)
BASOPHILS # BLD AUTO: 0 X10^3/UL (ref 0–0.1)
BASOPHILS NFR BLD AUTO: 0.5 % (ref 0.2–1)
BUN SERPL-MCNC: 17 MG/DL (ref 7–18)
BUN SERPL-MCNC: 17 MG/DL (ref 7–18)
CALCIUM ALBUM COR SERPL-SCNC: 137 MMOL/L (ref 136–145)
CALCIUM ALBUM COR SERPL-SCNC: 139 MMOL/L (ref 136–145)
CALCIUM ALBUM COR SERPL-SCNC: 9.9 MG/DL (ref 8.5–10.1)
CALCIUM SERPL-MCNC: 8.3 MG/DL (ref 8.5–10.1)
CALCIUM SERPL-MCNC: 8.5 MG/DL (ref 8.5–10.1)
CHLORIDE SERPL-SCNC: 104 MMOL/L (ref 98–107)
CHLORIDE SERPL-SCNC: 107 MMOL/L (ref 98–107)
CO2 SERPL-SCNC: 12 MMOL/L (ref 21–32)
CO2 SERPL-SCNC: 16.8 MMOL/L (ref 21–32)
CREAT SERPL-MCNC: 1.13 MG/DL (ref 0.55–1.02)
CREAT SERPL-MCNC: 1.15 MG/DL (ref 0.55–1.02)
EGFR  BLACK RACES: 60 (ref 60–?)
EGFR  BLACK RACES: > 60 (ref 60–?)
EOSINOPHIL # BLD AUTO: 0.1 X10^3/UL (ref 0–0.2)
EOSINOPHIL NFR BLD AUTO: 1.4 % (ref 0.9–2.9)
ERYTHROCYTE [DISTWIDTH] IN BLOOD BY AUTOMATED COUNT: 14.3 % (ref 11.6–16.5)
HCT VFR BLD AUTO: 29.2 % (ref 36–47)
HGB BLD-MCNC: 10.2 G/DL (ref 12–16)
LYMPHOCYTES # BLD AUTO: 0.9 X10^3/UL (ref 1.3–2.9)
LYMPHOCYTES NFR BLD AUTO: 14.7 % (ref 21–51)
MCH RBC QN AUTO: 32.6 PG (ref 27–34)
MCHC RBC AUTO-ENTMCNC: 35 G/DL (ref 33–35)
MCV RBC AUTO: 93.3 FL (ref 80–100)
MONOCYTES # BLD AUTO: 0.5 X10^3/UL (ref 0.3–0.8)
MONOCYTES NFR BLD AUTO: 7.8 % (ref 0–13)
NEUTROPHILS # BLD AUTO: 4.5 X10^3/UL (ref 2.2–4.8)
NEUTROPHILS NFR BLD AUTO: 75.6 % (ref 42–75)
PLATELET # BLD: 283 X10^3/UL (ref 150–450)
PMV BLD AUTO: 8.1 FL (ref 7.4–11)
PROT SERPL-MCNC: 6.2 G/DL (ref 6.4–8.2)
RBC # BLD AUTO: 3.13 X10^6/UL (ref 3.5–5.4)
SODIUM SERPL-SCNC: 132 MMOL/L (ref 136–145)
SODIUM SERPL-SCNC: 134 MMOL/L (ref 136–145)
WBC NRBC COR # BLD AUTO: 6 X10^3/UL (ref 3.6–10)

## 2017-11-01 RX ADMIN — LEVALBUTEROL HYDROCHLORIDE SCH EA: 1.25 SOLUTION RESPIRATORY (INHALATION) at 12:04

## 2017-11-01 RX ADMIN — LEVALBUTEROL HYDROCHLORIDE SCH EA: 1.25 SOLUTION RESPIRATORY (INHALATION) at 16:35

## 2017-11-01 RX ADMIN — LEVALBUTEROL HYDROCHLORIDE SCH EA: 1.25 SOLUTION RESPIRATORY (INHALATION) at 09:01

## 2017-11-01 RX ADMIN — Medication SCH MG: at 05:44

## 2017-11-01 RX ADMIN — BUDESONIDE SCH EA: 0.5 INHALANT RESPIRATORY (INHALATION) at 09:01

## 2017-11-01 RX ADMIN — Medication SCH MG: at 13:30

## 2017-11-07 ENCOUNTER — HOSPITAL ENCOUNTER (INPATIENT)
Dept: HOSPITAL 24 - MED/SURG | Age: 72
LOS: 6 days | Discharge: HOME | DRG: 947 | End: 2017-11-13
Attending: INTERNAL MEDICINE | Admitting: INTERNAL MEDICINE
Payer: COMMERCIAL

## 2017-11-07 VITALS — BODY MASS INDEX: 13.9 KG/M2

## 2017-11-07 DIAGNOSIS — I69.892: ICD-10-CM

## 2017-11-07 DIAGNOSIS — R94.30: ICD-10-CM

## 2017-11-07 DIAGNOSIS — E11.65: ICD-10-CM

## 2017-11-07 DIAGNOSIS — R53.1: ICD-10-CM

## 2017-11-07 DIAGNOSIS — J44.9: ICD-10-CM

## 2017-11-07 DIAGNOSIS — L89.892: ICD-10-CM

## 2017-11-07 DIAGNOSIS — N39.0: ICD-10-CM

## 2017-11-07 DIAGNOSIS — R26.89: ICD-10-CM

## 2017-11-07 DIAGNOSIS — R41.82: Primary | ICD-10-CM

## 2017-11-07 DIAGNOSIS — K21.9: ICD-10-CM

## 2017-11-07 DIAGNOSIS — I63.8: ICD-10-CM

## 2017-11-07 DIAGNOSIS — L89.301: ICD-10-CM

## 2017-11-07 DIAGNOSIS — I10: ICD-10-CM

## 2017-11-07 DIAGNOSIS — F41.8: ICD-10-CM

## 2017-11-07 LAB
ALBUMIN SERPL BCP-MCNC: 3.1 G/DL (ref 3.4–5)
ALP SERPL-CCNC: 110 UNITS/L (ref 46–116)
ALT SERPL W P-5'-P-CCNC: 21 UNITS/L (ref 12–78)
AMORPH SED URNS QL MICRO: (no result) /HPF
AST SERPL W P-5'-P-CCNC: 20 UNITS/L (ref 15–37)
BACTERIA URNS QL MICRO: (no result) /HPF
BASOPHILS # BLD AUTO: 0 X10^3/UL (ref 0–0.1)
BASOPHILS NFR BLD AUTO: 0.3 % (ref 0.2–1)
BILIRUB UR QL STRIP.AUTO: NEGATIVE
BUN SERPL-MCNC: 42 MG/DL (ref 7–18)
CALCIUM ALBUM COR SERPL-SCNC: 10.5 MG/DL (ref 8.5–10.1)
CALCIUM ALBUM COR SERPL-SCNC: 130 MMOL/L (ref 136–145)
CALCIUM SERPL-MCNC: 9.8 MG/DL (ref 8.5–10.1)
CHLORIDE SERPL-SCNC: 99 MMOL/L (ref 98–107)
CLARITY UR: (no result)
CO2 SERPL-SCNC: 16.9 MMOL/L (ref 21–32)
COLOR UR AUTO: YELLOW
CREAT SERPL-MCNC: 1.57 MG/DL (ref 0.55–1.02)
EGFR  BLACK RACES: 42 (ref 60–?)
EOSINOPHIL # BLD AUTO: 0.2 X10^3/UL (ref 0–0.2)
EOSINOPHIL NFR BLD AUTO: 1.7 % (ref 0.9–2.9)
ERYTHROCYTE [DISTWIDTH] IN BLOOD BY AUTOMATED COUNT: 14.4 % (ref 11.6–16.5)
GLUCOSE UR QL STRIP.AUTO: (no result)
HCT VFR BLD AUTO: 38.7 % (ref 36–47)
HGB BLD-MCNC: 13.2 G/DL (ref 12–16)
KETONES UR QL STRIP.AUTO: NEGATIVE
LEUKOCYTE ESTERASE UR QL STRIP.AUTO: (no result)
LYMPHOCYTES # BLD AUTO: 1.6 X10^3/UL (ref 1.3–2.9)
LYMPHOCYTES NFR BLD AUTO: 14.8 % (ref 21–51)
MCH RBC QN AUTO: 31.4 PG (ref 27–34)
MCHC RBC AUTO-ENTMCNC: 34.2 G/DL (ref 33–35)
MCV RBC AUTO: 92 FL (ref 80–100)
MONOCYTES # BLD AUTO: 0.6 X10^3/UL (ref 0.3–0.8)
MONOCYTES NFR BLD AUTO: 6.1 % (ref 0–13)
NEUTROPHILS # BLD AUTO: 8.1 X10^3/UL (ref 2.2–4.8)
NEUTROPHILS NFR BLD AUTO: 77.1 % (ref 42–75)
NITRITE UR QL STRIP.AUTO: NEGATIVE
PH UR STRIP.AUTO: 5 [PH] (ref 5–8)
PLATELET # BLD: 425 X10^3/UL (ref 150–450)
PMV BLD AUTO: 7.8 FL (ref 7.4–11)
PROT SERPL-MCNC: 7.5 G/DL (ref 6.4–8.2)
PROT UR QL STRIP.AUTO: (no result)
RBC # BLD AUTO: 4.21 X10^6/UL (ref 3.5–5.4)
RBC # UR STRIP.AUTO: (no result) /UL
RBC #/AREA URNS HPF: (no result) /HPF
SODIUM SERPL-SCNC: 129 MMOL/L (ref 136–145)
SP GR UR STRIP.AUTO: 1.01 (ref 1–1.03)
SQUAMOUS URNS QL MICRO: (no result) /HPF
UROBILINOGEN UR QL STRIP.AUTO: NORMAL
WBC NRBC COR # BLD AUTO: 10.5 X10^3/UL (ref 3.6–10)

## 2017-11-07 PROCEDURE — 85610 PROTHROMBIN TIME: CPT

## 2017-11-07 PROCEDURE — 84132 ASSAY OF SERUM POTASSIUM: CPT

## 2017-11-07 PROCEDURE — 82140 ASSAY OF AMMONIA: CPT

## 2017-11-07 PROCEDURE — 85730 THROMBOPLASTIN TIME PARTIAL: CPT

## 2017-11-07 PROCEDURE — 80200 ASSAY OF TOBRAMYCIN: CPT

## 2017-11-07 PROCEDURE — 87086 URINE CULTURE/COLONY COUNT: CPT

## 2017-11-07 PROCEDURE — G0378 HOSPITAL OBSERVATION PER HR: HCPCS

## 2017-11-07 PROCEDURE — 81001 URINALYSIS AUTO W/SCOPE: CPT

## 2017-11-07 PROCEDURE — 85025 COMPLETE CBC W/AUTO DIFF WBC: CPT

## 2017-11-07 PROCEDURE — 71010: CPT

## 2017-11-07 PROCEDURE — 80053 COMPREHEN METABOLIC PANEL: CPT

## 2017-11-07 PROCEDURE — 93010 ELECTROCARDIOGRAM REPORT: CPT

## 2017-11-07 PROCEDURE — 70551 MRI BRAIN STEM W/O DYE: CPT

## 2017-11-07 PROCEDURE — 93005 ELECTROCARDIOGRAM TRACING: CPT

## 2017-11-07 PROCEDURE — 82553 CREATINE MB FRACTION: CPT

## 2017-11-07 PROCEDURE — 80048 BASIC METABOLIC PNL TOTAL CA: CPT

## 2017-11-07 PROCEDURE — 93880 EXTRACRANIAL BILAT STUDY: CPT

## 2017-11-07 PROCEDURE — 82550 ASSAY OF CK (CPK): CPT

## 2017-11-07 PROCEDURE — 83735 ASSAY OF MAGNESIUM: CPT

## 2017-11-07 PROCEDURE — 84484 ASSAY OF TROPONIN QUANT: CPT

## 2017-11-07 PROCEDURE — 82565 ASSAY OF CREATININE: CPT

## 2017-11-07 PROCEDURE — 87040 BLOOD CULTURE FOR BACTERIA: CPT

## 2017-11-07 PROCEDURE — 36415 COLL VENOUS BLD VENIPUNCTURE: CPT

## 2017-11-07 PROCEDURE — 70450 CT HEAD/BRAIN W/O DYE: CPT

## 2017-11-08 LAB
AMMONIA PLAS-SCNC: 11 UMOL/L (ref 11–32)
BASOPHILS # BLD AUTO: 0 X10^3/UL (ref 0–0.1)
BASOPHILS NFR BLD AUTO: 0.5 % (ref 0.2–1)
BUN SERPL-MCNC: 37 MG/DL (ref 7–18)
CALCIUM ALBUM COR SERPL-SCNC: (no result) MMOL/L (ref 136–145)
CALCIUM SERPL-MCNC: 8.8 MG/DL (ref 8.5–10.1)
CHLORIDE SERPL-SCNC: 104 MMOL/L (ref 98–107)
CK MB SERPL-MCNC: 4.3 NG/ML (ref 0–4)
CK MB SERPL-MCNC: 4.5 NG/ML (ref 0–4)
CK SERPL-CCNC: 60 UNITS/L (ref 26–192)
CK SERPL-CCNC: 93 UNITS/L (ref 26–192)
CKMB %: 4.8 % (ref ?–4)
CKMB %: 7.2 % (ref ?–4)
CO2 SERPL-SCNC: 14 MMOL/L (ref 21–32)
CREAT SERPL-MCNC: 1.28 MG/DL (ref 0.55–1.02)
EGFR  BLACK RACES: 53 (ref 60–?)
EOSINOPHIL # BLD AUTO: 0.2 X10^3/UL (ref 0–0.2)
EOSINOPHIL NFR BLD AUTO: 1.9 % (ref 0.9–2.9)
ERYTHROCYTE [DISTWIDTH] IN BLOOD BY AUTOMATED COUNT: 14.5 % (ref 11.6–16.5)
HCT VFR BLD AUTO: 32.5 % (ref 36–47)
HGB BLD-MCNC: 11.1 G/DL (ref 12–16)
LYMPHOCYTES # BLD AUTO: 1.7 X10^3/UL (ref 1.3–2.9)
LYMPHOCYTES NFR BLD AUTO: 15.2 % (ref 21–51)
MCH RBC QN AUTO: 31.5 PG (ref 27–34)
MCHC RBC AUTO-ENTMCNC: 34.3 G/DL (ref 33–35)
MCV RBC AUTO: 91.9 FL (ref 80–100)
MONOCYTES # BLD AUTO: 1.5 X10^3/UL (ref 0.3–0.8)
MONOCYTES NFR BLD AUTO: 13.8 % (ref 0–13)
NEUTROPHILS # BLD AUTO: 7.5 X10^3/UL (ref 2.2–4.8)
NEUTROPHILS NFR BLD AUTO: 68.6 % (ref 42–75)
PLAT MORPH BLD: NORMAL
PLATELET # BLD: 357 X10^3/UL (ref 150–450)
PMV BLD AUTO: 7.9 FL (ref 7.4–11)
RBC # BLD AUTO: 3.54 X10^6/UL (ref 3.5–5.4)
RBC MORPH BLD: NORMAL
SODIUM SERPL-SCNC: 133 MMOL/L (ref 136–145)
TROPONIN I SERPL-MCNC: 0.02 NG/ML (ref 0–1.5)
TROPONIN I SERPL-MCNC: 0.03 NG/ML (ref 0–1.5)
WBC NRBC COR # BLD AUTO: 11 X10^3/UL (ref 3.6–10)

## 2017-11-08 RX ADMIN — HEPARIN SODIUM AND DEXTROSE PRN MLS/HR: 5000; 5 INJECTION INTRAVENOUS at 13:44

## 2017-11-08 RX ADMIN — HEPARIN SODIUM AND DEXTROSE PRN MLS/HR: 5000; 5 INJECTION INTRAVENOUS at 11:35

## 2017-11-08 NOTE — RAD
Examination: AP chest



History: SOB



Comparison reference: 10/31/2017



Findings: There is interval improvement in appearance of the left pleural effusion. There may be a sm
all amount of fluid left; the current study is technically limited. Heart size remains normal with no
 obvious pulmonary consolidation or pneumothorax.



Impression: Interval improvement in left pleural effusion. Conventional PA and lateral views would be
 helpful for more accurate evaluation. 

Reported By:Electronically Signed by AROLDO CONN MD at 11/8/2017 5:52:52 AM

## 2017-11-08 NOTE — VAS
HISTORY: Concern for carotid artery stenosis. COPD, diabetes, hypertension, CVA. CHF.



Technique: Multiple gray scale and color flow Doppler images of the right and left carotid arterial s
ystem were obtained. The vertebral arterial system was evaluated as well. 



Findings: 

Nonocclusive color flow Doppler is seen throughout the right and left carotid arterial system. No hem
odynamically significant carotid arterial stenosis is seen based on velocity criteria. There is Moder
ate atherosclerosis and plaque formation of the bilateral carotid bulbs and proximal ICAs with associ
ated intimal thickening but without evidence for high-grade stenosis (>70%) or occlusion of the carot
id arteries. The right and left vertebral artery demonstrate antegrade flow.





IMPRESSION:



Moderate atherosclerosis and hard plaque formation of the bilateral carotid bulbs and proximal ICAs w
ith associated carotid intimal thickening but without evidence for high-grade stenosis or occlusion o
f the carotid arteries, based on Doppler velocity criteria.



Appropriate, antegrade, vertebral arterial flow.



Peak right ICA velocity: 41 cm/sec.

Peak left ICA velocity: 57 cm/sec.





Reported By:Electronically Signed by ALETA SEAY III, MD at 11/8/2017 3:01:57 PM

## 2017-11-08 NOTE — DR.H&P
H&P





- History & Physical for Day of:


H&P Date: 11/07/17





- Chief Complaint


Chief Complaint: altered mental status per nursing home staff





- Allergies


Allergies/Adverse Reactions: 


 Allergies











Allergy/AdvReac Type Severity Reaction Status Date / Time


 


No Known Drug Allergies Allergy   Verified 10/29/17 09:56














- History of Present Illness


History of Present Illness: 72 WF ADMIT FROM NURSING HOME AFTER NURSING STAFF 

STATED PT HAD GENERALIZED WEAKNESS AND CONFUSION. PT WAS RECENTLY INPT WITH 

SIMILAR SYMPTOMS, ON PREVIOUS VISIT PT HAD SEVERE DEHYDRATION WITH HYPERKALEMIA 

AND UTI. PLAN TO ADMIT PT OBTAIN ADMISSION LABS, RESUME HOME MEDS.  IV ATBX FOR 

UTI, CARDIAC MONITORING





- Past Medical History


Past Medical History: Anxiety, COPD, Coronary Artery Disease, Depression, 

Diabetes, GERD, Hypertension, MI


Additional Medical History: colon cancer





- Past Surgical History


Surgical History: Bowel Resection, CABG/Valve Surgery


Additional Surgical History: colon cancer resection with colostomy formation





- Family History


Family Medical History: Diabetes Mellitus, Cancer, Coronary Artery Disease





- Social History


Does patient currently use any type of tobacco product: Yes


Have you used tobacco products in the last 12 months: Yes


Type of Tobacco Use: Cigarettes


How many years tobacco product used: 19


Alcohol Use: None


Drug Use: None





- Medications


Home Medications: 


 





Atenolol 25 mg PO DAILY 11/07/17 [History Confirmed 11/07/17]


Budesonide Nebule 0.5 mg/2 ml [PULMICORT NEBULE 0.5 MG *] 1 ea INH BID 11/07/17 

[History Confirmed 11/07/17]


Levalbuterol HCl [XOPENEX NEBULE 1.25 MG *] 1 ea INH QID 11/07/17 [History 

Confirmed 11/07/17]


Levofloxacin [LEVAQUIN  MG *] 250 mg PO DAILY 11/07/17 [History 

Confirmed 11/07/17]











- Review of Systems


Constitutional: Weakness


Eyes: No Symptoms Reported


ENT: No Symptoms Reported


Respiratory: No Symptoms Reported


Cardiovascular: No Symptoms Reported


Gastrointestinal: No Symptoms Reported


Genitourinary: Incontinence


Musculoskeletal: Shoulder Pain, Back Pain, Leg Pain


Skin: No Symptoms Reported


Neurological: Weakness (GENERALIZED).  denies: Change in Speech





- Physical Exam


Vital Signs: 


 





Temperature                      97.5 F


Pulse Rate [Right Radial]        72


Respiratory Rate                 18


Blood Pressure [Right Arm]       137/51


Blood Pressure [Left Arm]        89/44


Blood Pressure                   151/66


O2 Sat by Pulse Oximetry         96








Oriented: Person


Eyes: Normal


Ear: Normal


Throat: Normal


Respiratory: RLL Diminished, LLL Diminished


Cardiovascular: negative: Edema


: Normal


Auscultation: Bowel Sounds: Normal


Palpation: Normal


Tenderness: Normal


Skin: Decreased Turgur (MILDLY)


Musculoskeletal: Back:Lumbar


Psychiatric: Anxiety


Speech Pattern: Clear, Appropriate





- Assessment/Plan


(1) Generalized weakness


Status: Acute   


Plan: ADMIT, ADMISSION LABS, UA, URINE CULTURE.  IV ATBX, GENTLE IV HYDRATION, 

RESUME HOME MEDS   





(2) UTI (urinary tract infection) with pyuria


Status: Acute   





(3) Diabetes mellitus, type 2


Status: Chronic   





(4) GERD (gastroesophageal reflux disease)


Status: Chronic   





(5) History of CVA (cerebrovascular accident)


Status: Chronic   





(6) Hypertension


Status: Chronic

## 2017-11-08 NOTE — MRI
HISTORY:  AMS, generalized weakness, right temporal parietal infarction on CT today



Study:  MRI of the brain done without contrast



Comparison: CT scan of the brain performed earlier today.



Technique: Multiple imaging planes and pulse sequences realizing evaluating the brain by magnetic res
onance imaging. Diffusion weighted sequences and ADC maps were employed. No IV contrast agents were u
tilized.



Findings:



On the diffusion-weighted imaging sequences in the right temporoparietal region there is increased si
gnal compatible with acute or subacute infarction. Intensity of the signal indicates that this may be
 acute. Smaller , more punctate foci of increased signal present in the right periventricular region 
and in the right lateral thalamus, compatible with small areas of lacunar infarction. No other areas 
of signal abnormality is seen. Brainstem and cerebellum are unremarkable. The left cerebral hemispher
e is within normal limits.



IMPRESSION:

 

Findings compatible with acute infarction involving the right temporoparietal region, within the dist
ribution of the right middle cerebral artery. Smaller foci of periventricular and right lateral thala
mus lacunar infarcts are also seen.







Reported By:Electronically Signed by GRAEME RUBALCAVA MD at 11/8/2017 2:44:55 PM

## 2017-11-08 NOTE — CT
HISTORY: Altered mental status, weakness



Study:  CT brain without contrast



Comparison: None



Technique:

Multiple axial images of the brain were obtained from the skull base to the vertex without administra
tion of IV contrast.  Dose reduction techniques including Automated Exposure Control (AEC) and adjust
ment of mA and kV were utilized.



Findings: 



There is transcortical cerebral edema in the right temporal and parietal lobe suggesting evolving inf
arction in the right MCA territory. There is mild generalized cerebral volume loss and white matter d
isease likely due to microvascular ischemic changes. There is encephalomalacia in the right occipital
 lobe suggesting old infarction.  No evidence of acute hemorrhage, midline shift, mass effect or abno
rmal extra-axial fluid collection.  The ventricular system is symmetric and nondilated.  The soft tis
sues and osseous structures are unremarkable. There is dense opacification of the right maxillary sin
us and sinus wall thickening suggesting chronic sinusitis.



IMPRESSION:

 

1. Transcortical cerebral edema in the right temporal and parietal lobe suggesting evolving infarctio
n in the right MCA territory. This may be subacute, correlate clinically. No evidence of hemorrhage.

2. Microvascular ischemic changes and chronic appearing right occipital lobe infarct.

3. Right maxillary sinus disease.





Reported By:Electronically Signed by ROSA VO MD at 11/8/2017 11:05:29 AM

## 2017-11-09 LAB
ALBUMIN SERPL BCP-MCNC: 2.5 G/DL (ref 3.4–5)
ALP SERPL-CCNC: 88 UNITS/L (ref 46–116)
ALT SERPL W P-5'-P-CCNC: 18 UNITS/L (ref 12–78)
AST SERPL W P-5'-P-CCNC: 26 UNITS/L (ref 15–37)
BASOPHILS # BLD AUTO: 0.1 X10^3/UL (ref 0–0.1)
BASOPHILS NFR BLD AUTO: 0.7 % (ref 0.2–1)
BUN SERPL-MCNC: 25 MG/DL (ref 7–18)
CALCIUM ALBUM COR SERPL-SCNC: 140 MMOL/L (ref 136–145)
CALCIUM ALBUM COR SERPL-SCNC: 9.6 MG/DL (ref 8.5–10.1)
CALCIUM SERPL-MCNC: 8.4 MG/DL (ref 8.5–10.1)
CHLORIDE SERPL-SCNC: 110 MMOL/L (ref 98–107)
CO2 SERPL-SCNC: 15.3 MMOL/L (ref 21–32)
CREAT SERPL-MCNC: 1.17 MG/DL (ref 0.55–1.02)
EGFR  BLACK RACES: 59 (ref 60–?)
EOSINOPHIL # BLD AUTO: 0.1 X10^3/UL (ref 0–0.2)
EOSINOPHIL NFR BLD AUTO: 1.3 % (ref 0.9–2.9)
ERYTHROCYTE [DISTWIDTH] IN BLOOD BY AUTOMATED COUNT: 14.6 % (ref 11.6–16.5)
HCT VFR BLD AUTO: 29.8 % (ref 36–47)
HGB BLD-MCNC: 10.3 G/DL (ref 12–16)
LYMPHOCYTES # BLD AUTO: 1.2 X10^3/UL (ref 1.3–2.9)
LYMPHOCYTES NFR BLD AUTO: 14.7 % (ref 21–51)
MCH RBC QN AUTO: 31.7 PG (ref 27–34)
MCHC RBC AUTO-ENTMCNC: 34.6 G/DL (ref 33–35)
MCV RBC AUTO: 91.8 FL (ref 80–100)
MONOCYTES # BLD AUTO: 0.7 X10^3/UL (ref 0.3–0.8)
MONOCYTES NFR BLD AUTO: 7.8 % (ref 0–13)
NEUTROPHILS # BLD AUTO: 6.5 X10^3/UL (ref 2.2–4.8)
NEUTROPHILS NFR BLD AUTO: 75.5 % (ref 42–75)
PLATELET # BLD: 295 X10^3/UL (ref 150–450)
PMV BLD AUTO: 7.8 FL (ref 7.4–11)
PROT SERPL-MCNC: 5.4 G/DL (ref 6.4–8.2)
RBC # BLD AUTO: 3.24 X10^6/UL (ref 3.5–5.4)
SODIUM SERPL-SCNC: 140 MMOL/L (ref 136–145)
WBC NRBC COR # BLD AUTO: 8.5 X10^3/UL (ref 3.6–10)

## 2017-11-09 RX ADMIN — SODIUM CHLORIDE SCH MLS/HR: 900 INJECTION INTRAVENOUS at 08:08

## 2017-11-09 RX ADMIN — Medication PRN MG: at 06:54

## 2017-11-09 RX ADMIN — POTASSIUM CHLORIDE PRN MLS/HR: 7.46 INJECTION, SOLUTION INTRAVENOUS at 06:47

## 2017-11-09 RX ADMIN — MAGNESIUM SULFATE HEPTAHYDRATE PRN MLS/HR: 1 INJECTION, SOLUTION INTRAVENOUS at 06:03

## 2017-11-09 RX ADMIN — SODIUM CHLORIDE SCH MLS/HR: 9 INJECTION, SOLUTION INTRAVENOUS at 11:39

## 2017-11-09 RX ADMIN — SODIUM CHLORIDE SCH MLS/HR: 9 INJECTION, SOLUTION INTRAVENOUS at 06:07

## 2017-11-09 RX ADMIN — POTASSIUM CHLORIDE PRN MLS/HR: 7.46 INJECTION, SOLUTION INTRAVENOUS at 08:25

## 2017-11-09 RX ADMIN — SODIUM CHLORIDE SCH: 9 INJECTION, SOLUTION INTRAVENOUS at 21:00

## 2017-11-09 RX ADMIN — TOBRAMYCIN SULFATE SCH MLS/HR: 40 INJECTION, SOLUTION INTRAMUSCULAR; INTRAVENOUS at 08:09

## 2017-11-09 RX ADMIN — Medication PRN MG: at 08:10

## 2017-11-10 LAB
ALBUMIN SERPL BCP-MCNC: 2.4 G/DL (ref 3.4–5)
ALP SERPL-CCNC: 84 UNITS/L (ref 46–116)
ALT SERPL W P-5'-P-CCNC: 14 UNITS/L (ref 12–78)
AST SERPL W P-5'-P-CCNC: 23 UNITS/L (ref 15–37)
BASOPHILS # BLD AUTO: 0.1 X10^3/UL (ref 0–0.1)
BASOPHILS NFR BLD AUTO: 0.7 % (ref 0.2–1)
BUN SERPL-MCNC: 17 MG/DL (ref 7–18)
CALCIUM ALBUM COR SERPL-SCNC: 140 MMOL/L (ref 136–145)
CALCIUM ALBUM COR SERPL-SCNC: 9.8 MG/DL (ref 8.5–10.1)
CALCIUM SERPL-MCNC: 8.5 MG/DL (ref 8.5–10.1)
CHLORIDE SERPL-SCNC: 108 MMOL/L (ref 98–107)
CO2 SERPL-SCNC: 18.7 MMOL/L (ref 21–32)
CREAT SERPL-MCNC: 1.12 MG/DL (ref 0.55–1.02)
EGFR  BLACK RACES: > 60 (ref 60–?)
EOSINOPHIL # BLD AUTO: 0.1 X10^3/UL (ref 0–0.2)
EOSINOPHIL NFR BLD AUTO: 1.2 % (ref 0.9–2.9)
ERYTHROCYTE [DISTWIDTH] IN BLOOD BY AUTOMATED COUNT: 14.4 % (ref 11.6–16.5)
HCT VFR BLD AUTO: 31.6 % (ref 36–47)
HGB BLD-MCNC: 10.8 G/DL (ref 12–16)
LYMPHOCYTES # BLD AUTO: 1.4 X10^3/UL (ref 1.3–2.9)
LYMPHOCYTES NFR BLD AUTO: 18.2 % (ref 21–51)
MAGNESIUM SERPL-MCNC: 1.8 MG/DL (ref 1.7–2.9)
MCH RBC QN AUTO: 31.5 PG (ref 27–34)
MCHC RBC AUTO-ENTMCNC: 34.1 G/DL (ref 33–35)
MCV RBC AUTO: 92.5 FL (ref 80–100)
MONOCYTES # BLD AUTO: 0.6 X10^3/UL (ref 0.3–0.8)
MONOCYTES NFR BLD AUTO: 8.2 % (ref 0–13)
NEUTROPHILS # BLD AUTO: 5.3 X10^3/UL (ref 2.2–4.8)
NEUTROPHILS NFR BLD AUTO: 71.7 % (ref 42–75)
PLATELET # BLD: 264 X10^3/UL (ref 150–450)
PMV BLD AUTO: 7.6 FL (ref 7.4–11)
PROT SERPL-MCNC: 6.3 G/DL (ref 6.4–8.2)
RBC # BLD AUTO: 3.41 X10^6/UL (ref 3.5–5.4)
SODIUM SERPL-SCNC: 138 MMOL/L (ref 136–145)
WBC NRBC COR # BLD AUTO: 7.4 X10^3/UL (ref 3.6–10)

## 2017-11-10 RX ADMIN — AMLODIPINE BESYLATE SCH MG: 10 TABLET ORAL at 20:40

## 2017-11-10 RX ADMIN — TOBRAMYCIN SULFATE SCH MLS/HR: 40 INJECTION, SOLUTION INTRAMUSCULAR; INTRAVENOUS at 20:39

## 2017-11-10 RX ADMIN — POTASSIUM CHLORIDE PRN MLS/HR: 7.46 INJECTION, SOLUTION INTRAVENOUS at 17:20

## 2017-11-10 RX ADMIN — SODIUM CHLORIDE SCH MLS/HR: 900 INJECTION INTRAVENOUS at 08:53

## 2017-11-10 RX ADMIN — SODIUM CHLORIDE SCH: 9 INJECTION, SOLUTION INTRAVENOUS at 05:38

## 2017-11-10 RX ADMIN — AMLODIPINE BESYLATE SCH: 10 TABLET ORAL at 17:12

## 2017-11-10 RX ADMIN — ASPIRIN SCH: 81 TABLET, COATED ORAL at 17:12

## 2017-11-10 RX ADMIN — CLOPIDOGREL BISULFATE SCH MG: 75 TABLET ORAL at 20:40

## 2017-11-10 RX ADMIN — POTASSIUM CHLORIDE PRN MLS/HR: 7.46 INJECTION, SOLUTION INTRAVENOUS at 06:49

## 2017-11-10 RX ADMIN — SODIUM CHLORIDE SCH: 9 INJECTION, SOLUTION INTRAVENOUS at 17:12

## 2017-11-10 RX ADMIN — HEPARIN SODIUM AND DEXTROSE PRN MLS/HR: 5000; 5 INJECTION INTRAVENOUS at 02:01

## 2017-11-10 RX ADMIN — CLOPIDOGREL BISULFATE SCH: 75 TABLET ORAL at 17:12

## 2017-11-11 LAB
ALBUMIN SERPL BCP-MCNC: 2.8 G/DL (ref 3.4–5)
ALP SERPL-CCNC: 87 UNITS/L (ref 46–116)
ALT SERPL W P-5'-P-CCNC: 16 UNITS/L (ref 12–78)
AST SERPL W P-5'-P-CCNC: 25 UNITS/L (ref 15–37)
BASOPHILS # BLD AUTO: 0 X10^3/UL (ref 0–0.1)
BASOPHILS NFR BLD AUTO: 0.6 % (ref 0.2–1)
BUN SERPL-MCNC: 11 MG/DL (ref 7–18)
CALCIUM ALBUM COR SERPL-SCNC: 141 MMOL/L (ref 136–145)
CALCIUM ALBUM COR SERPL-SCNC: 9.9 MG/DL (ref 8.5–10.1)
CALCIUM SERPL-MCNC: 8.9 MG/DL (ref 8.5–10.1)
CHLORIDE SERPL-SCNC: 106 MMOL/L (ref 98–107)
CO2 SERPL-SCNC: 16.6 MMOL/L (ref 21–32)
CREAT SERPL-MCNC: 0.93 MG/DL (ref 0.55–1.02)
EGFR  BLACK RACES: > 60 (ref 60–?)
EOSINOPHIL # BLD AUTO: 0.1 X10^3/UL (ref 0–0.2)
EOSINOPHIL NFR BLD AUTO: 1.8 % (ref 0.9–2.9)
ERYTHROCYTE [DISTWIDTH] IN BLOOD BY AUTOMATED COUNT: 14.4 % (ref 11.6–16.5)
HCT VFR BLD AUTO: 36 % (ref 36–47)
HGB BLD-MCNC: 12.2 G/DL (ref 12–16)
LYMPHOCYTES # BLD AUTO: 1.6 X10^3/UL (ref 1.3–2.9)
LYMPHOCYTES NFR BLD AUTO: 20.6 % (ref 21–51)
MCH RBC QN AUTO: 31.2 PG (ref 27–34)
MCHC RBC AUTO-ENTMCNC: 33.9 G/DL (ref 33–35)
MCV RBC AUTO: 91.9 FL (ref 80–100)
MONOCYTES # BLD AUTO: 0.6 X10^3/UL (ref 0.3–0.8)
MONOCYTES NFR BLD AUTO: 7.2 % (ref 0–13)
NEUTROPHILS # BLD AUTO: 5.4 X10^3/UL (ref 2.2–4.8)
NEUTROPHILS NFR BLD AUTO: 69.8 % (ref 42–75)
PLATELET # BLD: 275 X10^3/UL (ref 150–450)
PMV BLD AUTO: 8.4 FL (ref 7.4–11)
PROT SERPL-MCNC: 7 G/DL (ref 6.4–8.2)
RBC # BLD AUTO: 3.91 X10^6/UL (ref 3.5–5.4)
SODIUM SERPL-SCNC: 139 MMOL/L (ref 136–145)
WBC NRBC COR # BLD AUTO: 7.7 X10^3/UL (ref 3.6–10)

## 2017-11-11 RX ADMIN — SODIUM CHLORIDE SCH: 9 INJECTION, SOLUTION INTRAVENOUS at 07:55

## 2017-11-11 RX ADMIN — AMLODIPINE BESYLATE SCH: 10 TABLET ORAL at 09:27

## 2017-11-11 RX ADMIN — SODIUM CHLORIDE SCH MLS/HR: 900 INJECTION INTRAVENOUS at 09:18

## 2017-11-11 RX ADMIN — CLOPIDOGREL BISULFATE SCH: 75 TABLET ORAL at 09:27

## 2017-11-11 RX ADMIN — ASPIRIN SCH MG: 81 TABLET, COATED ORAL at 09:18

## 2017-11-11 RX ADMIN — CLOPIDOGREL BISULFATE SCH MG: 75 TABLET ORAL at 09:18

## 2017-11-11 RX ADMIN — ATENOLOL SCH: 25 TABLET ORAL at 09:27

## 2017-11-11 RX ADMIN — SODIUM CHLORIDE SCH MLS/HR: 9 INJECTION, SOLUTION INTRAVENOUS at 00:10

## 2017-11-11 RX ADMIN — AMLODIPINE BESYLATE SCH MG: 10 TABLET ORAL at 09:18

## 2017-11-11 RX ADMIN — ASPIRIN SCH: 81 TABLET, COATED ORAL at 09:27

## 2017-11-12 LAB
ALBUMIN SERPL BCP-MCNC: 2.8 G/DL (ref 3.4–5)
ALP SERPL-CCNC: 79 UNITS/L (ref 46–116)
ALT SERPL W P-5'-P-CCNC: 14 UNITS/L (ref 12–78)
AST SERPL W P-5'-P-CCNC: 17 UNITS/L (ref 15–37)
BASOPHILS # BLD AUTO: 0 X10^3/UL (ref 0–0.1)
BASOPHILS NFR BLD AUTO: 0.4 % (ref 0.2–1)
BUN SERPL-MCNC: 11 MG/DL (ref 7–18)
CALCIUM ALBUM COR SERPL-SCNC: 10 MG/DL (ref 8.5–10.1)
CALCIUM ALBUM COR SERPL-SCNC: 142 MMOL/L (ref 136–145)
CALCIUM SERPL-MCNC: 9 MG/DL (ref 8.5–10.1)
CHLORIDE SERPL-SCNC: 108 MMOL/L (ref 98–107)
CO2 SERPL-SCNC: 18.8 MMOL/L (ref 21–32)
CREAT SERPL-MCNC: 1.02 MG/DL (ref 0.55–1.02)
CREAT SERPL-MCNC: 1.12 MG/DL (ref 0.55–1.02)
EGFR  BLACK RACES: > 60 (ref 60–?)
EOSINOPHIL # BLD AUTO: 0.2 X10^3/UL (ref 0–0.2)
EOSINOPHIL NFR BLD AUTO: 2.1 % (ref 0.9–2.9)
ERYTHROCYTE [DISTWIDTH] IN BLOOD BY AUTOMATED COUNT: 14.4 % (ref 11.6–16.5)
HCT VFR BLD AUTO: 34.7 % (ref 36–47)
HGB BLD-MCNC: 11.8 G/DL (ref 12–16)
LYMPHOCYTES # BLD AUTO: 1.2 X10^3/UL (ref 1.3–2.9)
LYMPHOCYTES NFR BLD AUTO: 14.2 % (ref 21–51)
MCH RBC QN AUTO: 31.1 PG (ref 27–34)
MCHC RBC AUTO-ENTMCNC: 34 G/DL (ref 33–35)
MCV RBC AUTO: 91.5 FL (ref 80–100)
MONOCYTES # BLD AUTO: 0.7 X10^3/UL (ref 0.3–0.8)
MONOCYTES NFR BLD AUTO: 9.2 % (ref 0–13)
NEUTROPHILS # BLD AUTO: 6 X10^3/UL (ref 2.2–4.8)
NEUTROPHILS NFR BLD AUTO: 74.1 % (ref 42–75)
PLATELET # BLD: 271 X10^3/UL (ref 150–450)
PMV BLD AUTO: 8.1 FL (ref 7.4–11)
PROT SERPL-MCNC: 6.8 G/DL (ref 6.4–8.2)
RBC # BLD AUTO: 3.79 X10^6/UL (ref 3.5–5.4)
SODIUM SERPL-SCNC: 140 MMOL/L (ref 136–145)
TOBRAMYCIN TROUGH SERPL-MCNC: 0.4 UG/ML (ref 0–2)
WBC NRBC COR # BLD AUTO: 8.1 X10^3/UL (ref 3.6–10)

## 2017-11-12 RX ADMIN — POTASSIUM CHLORIDE PRN MLS/HR: 7.46 INJECTION, SOLUTION INTRAVENOUS at 13:40

## 2017-11-12 RX ADMIN — SODIUM CHLORIDE SCH: 9 INJECTION, SOLUTION INTRAVENOUS at 11:26

## 2017-11-12 RX ADMIN — POTASSIUM CHLORIDE PRN MLS/HR: 7.46 INJECTION, SOLUTION INTRAVENOUS at 06:46

## 2017-11-12 RX ADMIN — POTASSIUM CHLORIDE PRN MLS/HR: 7.46 INJECTION, SOLUTION INTRAVENOUS at 11:55

## 2017-11-12 RX ADMIN — ASPIRIN SCH MG: 81 TABLET, COATED ORAL at 09:01

## 2017-11-12 RX ADMIN — MAGNESIUM SULFATE HEPTAHYDRATE PRN MLS/HR: 1 INJECTION, SOLUTION INTRAVENOUS at 07:25

## 2017-11-12 RX ADMIN — MAGNESIUM SULFATE HEPTAHYDRATE PRN MLS/HR: 1 INJECTION, SOLUTION INTRAVENOUS at 08:56

## 2017-11-12 RX ADMIN — ATENOLOL SCH MG: 25 TABLET ORAL at 10:07

## 2017-11-12 RX ADMIN — SODIUM CHLORIDE SCH MLS/HR: 9 INJECTION, SOLUTION INTRAVENOUS at 21:24

## 2017-11-12 RX ADMIN — SODIUM CHLORIDE SCH MLS/HR: 900 INJECTION INTRAVENOUS at 08:56

## 2017-11-12 RX ADMIN — POTASSIUM CHLORIDE PRN MLS/HR: 7.46 INJECTION, SOLUTION INTRAVENOUS at 10:17

## 2017-11-12 RX ADMIN — TOBRAMYCIN SULFATE SCH MLS/HR: 40 INJECTION, SOLUTION INTRAMUSCULAR; INTRAVENOUS at 10:29

## 2017-11-12 RX ADMIN — CLOPIDOGREL BISULFATE SCH MG: 75 TABLET ORAL at 09:00

## 2017-11-12 RX ADMIN — AMLODIPINE BESYLATE SCH MG: 10 TABLET ORAL at 09:00

## 2017-11-12 NOTE — PCM.PROG
Progress Note





- Progress Note for Day of


Date: 11/11/17





- Subjective


Subjective:  IS A PATIENT OF . SHE WAS ADMITTED FOR 

GENERALIZED WEAKNESS. DURING HOSPITALIZATION, SHE WAS FOUND TO HAVE HAD AN 

ACUTE CVA. TODAY, SHE REMAINS IN THE INTENSIVE CARE UNIT FOR CLOSE MONITORING. 

ON MORNING ROUNDS, PATIENT HAS INAPPROPRIATE RESPONSES TO QUESTIONS. SHE IS NOT 

ORIENTED TO SITUATION. IT SHOULD BE NOTED THAT PATIENT ALSO HAS A HISTORY OF 

CHRONIC DEMENTIA. ON EXAMINATION, PATIENT HAS USE OF ALL EXTREMITIES, HOWEVER, 

LEFT SIDE IS WEAKER THAN THE RIGHT. LUNGS ARE NOTED WITH WHEEZING AND RHONCHI 

THROUGHOUT. ABDOMEN IS FLAT, SOFT, AND NON-TENDER. NORMAL BOWEL SOUNDS ARE 

NOTED IN ALL QUADRANTS. HER VITAL SIGNS THIS MORNING ARE 97.4-83-%-170/

77. ABNORMAL LAB VALUES INCLUDE THE FOLLOWING: PTT 33.9, CARBON DIOXIDE 16.6, 

GLUCOSE 170, ALBUMIN 2.8, A/G RATIO 0.7. STAFF REPORTS THAT PATIENT IS REFUSING 

TO TAKE MORNING MEDICATIONS AS WELL AS REFUSES LIQUIDS. A HEPARIN DRIP IS NOTED 

TO BE INFUSING AT THIS TIME ALTHOUGH IT APPEARS TO HAVE BEEN DISCONTINUED BY 

HER PRIMARY CARE PHYSICIAN YESTERDAY. WE INSTRUCTED STAFF TO DISCONTINUE AT 

THIS TIME. TODAY, WE WILL CONTINUE WITH CURRENT PLAN OF CARE. WE WILL CONTINUE 

TO HAVE PHYSICAL THERAPY WORK WITH PATIENT. WE PLAN TO FOLLOW UP WITH AM LABS 

AND CONTINUE TO MONITOR PATIENT.





- Past Medical Family Social History


Past Med/Fam/Surg Hx: No changes since H&P


Allergies: 


Allergies





No Known Drug Allergies Allergy (Verified 10/29/17 09:56)


 











- Vital Signs and I&O's


Vital Signs: 


 





Temperature                      98.6 F


Pulse Rate [Apical]              80


Pulse Rate [Right Radial]        83


Respiratory Rate                 14


Blood Pressure [Right Arm]       137/51


Blood Pressure [Left Arm]        144/67


Blood Pressure                   151/66


O2 Sat by Pulse Oximetry         100








Intake and Output: 


 Intake & Output











 11/10/17 11/11/17 11/12/17 11/13/17





 11:59 11:59 11:59 11:59


 


Intake Total 2091 1998 1784 1598


 


Output Total 1100 1400 1300 725


 


Balance 991 598 484 873














- Physical Exam


Oriented: Person


Eyes: Normal


Ear: Normal


Nose: Normal


Throat: Normal


Respiratory: Right, Left, Generalized, Wheezes, Rhonchi


Cardiovascular: Normal.  negative: Edema


: Normal


Auscultation: Bowel Sounds: Normal


Palpation: Normal


Tenderness: Normal


Skin: Decreased Turgur (MILDLY)


Musculoskeletal: Normal


Psychiatric: Anxiety, Other (CONFUSION )


Mood Description: Calm


Affect: Normal


Speech Pattern: Inappropriate





- Laboratory and Diagnostics


Result Diagrams: 


 11/12/17 04:10





 11/12/17 18:15


Labs: 


 





11/07/17 20:38   Blood   Blood Culture - Final


11/07/17 21:04   Urine,Catheterized   Urine Culture - Final


11/08/17 05:35   Blood   Blood Culture - Preliminary





 Laboratory











WBC  8.1 X10^3/uL (3.6-10.0)   11/12/17  04:10    


 


RBC  3.79 X10^6/uL (3.5-5.4)   11/12/17  04:10    


 


Hgb  11.8 g/dL (12.0-16.0)  L  11/12/17  04:10    


 


Hct  34.7 % (36.0-47.0)  L  11/12/17  04:10    


 


MCV  91.5 fL (80.0-100.0)   11/12/17  04:10    


 


MCH  31.1 pg (27.0-34.0)   11/12/17  04:10    


 


MCHC  34.0 g/dL (33.0-35.0)   11/12/17  04:10    


 


RDW  14.4 % (11.6-16.5)   11/12/17  04:10    


 


Plt Count  271 X10^3/uL (150.0-450.0)   11/12/17  04:10    


 


Plt Count Comment  Adequate  (ADEQUATE)   11/08/17  05:35    


 


MPV  8.1 fL (7.4-11.0)   11/12/17  04:10    


 


Neut %  74.1 % (42.0-75.0)   11/12/17  04:10    


 


Lymph %  14.2 % (21.0-51.0)  L  11/12/17  04:10    


 


Mono %  9.2 % (0.0-13.0)   11/12/17  04:10    


 


Eos %  2.1 % (0.9-2.9)   11/12/17  04:10    


 


Baso %  0.4 % (0.2-1.0)   11/12/17  04:10    


 


Neut #  6.0 x10^3/uL (2.2-4.8)  H  11/12/17  04:10    


 


Lymph #  1.2 X10^3/uL (1.3-2.9)  L  11/12/17  04:10    


 


Mono #  0.7 x10^3/uL (0.3-0.8)   11/12/17  04:10    


 


Eos #  0.2 x10^3/uL (0.0-0.2)   11/12/17  04:10    


 


Baso #  0.0 X10^3/uL (0.0-0.1)   11/12/17  04:10    


 


Absolute Nucleated RBC  0.2 /100WBC  11/12/17  04:10    


 


Total Counted  100   11/08/17  05:35    


 


Neutrophils % (Manual)  71 % (39-76)   11/08/17  05:35    


 


Lymphocytes % (Manual)  23 % (13-43)   11/08/17  05:35    


 


Monocytes % (Manual)  6 % (4-9)   11/08/17  05:35    


 


Plt Morphology Comment  Normal  (NORMAL)   11/08/17  05:35    


 


RBC Morphology  Normal  (NORMAL)   11/08/17  05:35    


 


INR Target Range  -   11/08/17  11:58    


 


INR  1.16  (0.8-1.3)   11/08/17  11:58    


 


PTT  33.9 SECONDS (22.9-36.5)   11/11/17  12:45    


 


PTT Comment  -   11/11/17  12:45    


 


Sodium  140 mmol/L (136-145)   11/12/17  04:10    


 


Corrected Sodium  142 mmol/L (136-145)   11/12/17  04:10    


 


Potassium  3.9 mmol/L (3.5-5.1)   11/12/17  18:15    


 


Chloride  108 mmol/L ()  H  11/12/17  04:10    


 


Carbon Dioxide  18.8 mmol/L (21-32)  L  11/12/17  04:10    


 


BUN  11 mg/dL (7-18)   11/12/17  04:10    


 


Creatinine  1.02 mg/dL (0.55-1.02)   11/12/17  09:40    


 


Est GFR (MDRD) Af Amer  > 60  (>60)   11/12/17  04:10    


 


Est GFR (MDRD) Non-Af  51  (>60)  L  11/12/17  04:10    


 


Glucose  168 mg/dL (65-99)  H  11/12/17  04:10    


 


POC Glucose (mg/dL)  141 mg/dL (65-99)  H  11/12/17  16:25    


 


Calcium  9.0 mg/dL (8.5-10.1)   11/12/17  04:10    


 


Corrected Calcium  10.0 mg/dL (8.5-10.1)   11/12/17  04:10    


 


Magnesium  1.5 mg/dL (1.7-2.9)  L  11/12/17  04:10    


 


Total Bilirubin  0.30 mg/dL (0.2-1.0)   11/12/17  04:10    


 


AST  17 Units/L (15-37)   11/12/17  04:10    


 


ALT  14 Units/L (12-78)   11/12/17  04:10    


 


Alkaline Phosphatase  79 Units/L ()   11/12/17  04:10    


 


Ammonia  11 umol/L (11-32)   11/08/17  10:00    


 


Creatine Kinase  93 Units/L ()   11/08/17  16:13    


 


CK-MB (CK-2)  4.5 ng/mL (0-4.0)  H*  11/08/17  16:13    


 


CK/CKMB % Calc  4.8 % (<4)   11/08/17  16:13    


 


Troponin I  0.03 ng/mL (0-1.5)   11/08/17  16:13    


 


Total Protein  6.8 g/dL (6.4-8.2)   11/12/17  04:10    


 


Albumin  2.8 g/dL (3.4-5.0)  L  11/12/17  04:10    


 


Globulin  4.0 g/dL (2.5-4.5)   11/12/17  04:10    


 


Albumin/Globulin Ratio  0.7 Ratio (1.1-2.1)  L  11/12/17  04:10    


 


Specimen Type  Catherized urine   11/07/17  21:04    


 


Urine Color  Yellow  (YELLOW)   11/07/17  21:04    


 


Urine Appearance  Slightly hazy  (CLEAR)   11/07/17  21:04    


 


Urine pH  5.0  (5.0 - 8.0)   11/07/17  21:04    


 


Ur Specific Gravity  1.015  (1.000-1.030)   11/07/17  21:04    


 


Urine Protein  2+  (NEGATIVE)   11/07/17  21:04    


 


Urine Glucose (UA)  2+  (NEGATIVE)   11/07/17  21:04    


 


Urine Ketones  Negative  (NEGATIVE)   11/07/17  21:04    


 


Urine Occult Blood  1+  (NEGATIVE)   11/07/17  21:04    


 


Urine Nitrite  Negative  (NEGATIVE)   11/07/17  21:04    


 


Urine Bilirubin  Negative  (NEGATIVE)   11/07/17  21:04    


 


Urine Urobilinogen  Normal  (NORMAL)   11/07/17  21:04    


 


Ur Leukocyte Esterase  2+  (NEGATIVE)   11/07/17  21:04    


 


Urine RBC  3-5 /HPF (NEGATIVE)   11/07/17  21:04    


 


Urine WBC  10-15 /HPF (NEGATIVE)   11/07/17  21:04    


 


Ur Squamous Epith Cells  Rare /HPF (NEGATIVE)   11/07/17  21:04    


 


Amorphous Sediment  Trace /HPF (NEGATIVE)   11/07/17  21:04    


 


Urine Bacteria  1+ /HPF (NEGATIVE)   11/07/17  21:04    


 


Ur Culture Indicated?  Yes/culture set up   11/07/17  21:04    


 


Tobramycin Trough  0.4 ug/mL (0-2)   11/12/17  09:40    














- Plan


(1) Acute CVA (cerebrovascular accident)


Status: Acute   


Plan: CONTINUE ASPIRIN, CONTINUE PLAVIX, CONTINUE SUPPLEMENTAL OXYGEN, TELEMETRY

, CONTINUE TO MONITOR   





(2) Generalized weakness


Status: Acute   


Plan: CONTINUE NORMAL SALINE, CONTINUE PHYSICAL THERAPY, CONTINUE TO MONITOR

## 2017-11-12 NOTE — PCM.PROG
Progress Note





- Progress Note for Day of


Date: 11/12/17





- Subjective


Subjective:  IS A PATIENT OF . SHE WAS ADMITTED FOR 

GENERALIZED WEAKNESS. DURING HOSPITALIZATION, SHE WAS FOUND TO HAVE HAD AN 

ACUTE CVA. TODAY, SHE REMAINS IN THE INTENSIVE CARE UNIT FOR CLOSE MONITORING. 

ON MORNING ROUNDS, PATIENT CONTINUES WITH CONFUSION AT TIMES AND LEFT SIDED 

WEAKNESS. LUNGS CONTINUE WITH WHEEZING AND RHONCHI THROUGHOUT. ABDOMEN IS FLAT, 

SOFT, AND NON-TENDER. NORMAL BOWEL SOUNDS ARE NOTED IN ALL QUADRANTS. HER VITAL 

SIGNS THIS MORNING ARE 97.8-80-%-149/66. ABNORMAL LAB VALUES INCLUDE THE 

FOLLOWING: HGB 11.8, HCT 34.7, POTASSIUM 3.2, CHLORIDE 108, CARBON DIOXIDE 18.8

, GFR 51, GLUCOSE 168, ALBUMIN 2.8, A/G RATIO 0.7. STAFF REPORTS THAT PATIENT 

HAS BEEN MORE COOPERATIVE WITH MORNING MEDICATIONS. TODAY, WE WILL CONTINUE 

WITH CURRENT PLAN OF CARE. SHE WILL RECEIVE POTASSIUM REPLACEMENT. WE PLAN TO 

ORDER AM LABS.  AND KAIN BOLIVAR WILL RESUME CARE OF PATIENT 

IN THE MORNING.





- Past Medical Family Social History


Past Med/Fam/Surg Hx: No changes since H&P


Allergies: 


Allergies





No Known Drug Allergies Allergy (Verified 10/29/17 09:56)


 











- Review of Systems


ROS: No change since H&P





- Vital Signs and I&O's


Vital Signs: 


 





Temperature                      98.6 F


Pulse Rate [Apical]              80


Pulse Rate [Right Radial]        83


Respiratory Rate                 14


Blood Pressure [Right Arm]       137/51


Blood Pressure [Left Arm]        144/67


Blood Pressure                   151/66


O2 Sat by Pulse Oximetry         100








Intake and Output: 


 Intake & Output











 11/10/17 11/11/17 11/12/17 11/13/17





 11:59 11:59 11:59 11:59


 


Intake Total 2091 1998 1784 1598


 


Output Total 1100 1400 1300 725


 


Balance 991 598 484 873














- Physical Exam


Oriented: Person


Eyes: Normal


Ear: Normal


Nose: Normal


Throat: Normal


Respiratory: Right, Left, Generalized, Wheezes, Rhonchi


Cardiovascular: Normal.  negative: Edema


: Normal


Auscultation: Bowel Sounds: Normal


Palpation: Normal


Tenderness: Normal


Skin: Decreased Turgur (MILDLY)


Musculoskeletal: Normal


Psychiatric: Anxiety, Other (CONFUSION )


Mood Description: Calm


Affect: Normal


Speech Pattern: Inappropriate





- Laboratory and Diagnostics


Result Diagrams: 


 11/12/17 04:10





 11/12/17 18:15


Labs: 


 





11/07/17 20:38   Blood   Blood Culture - Final


11/07/17 21:04   Urine,Catheterized   Urine Culture - Final


11/08/17 05:35   Blood   Blood Culture - Preliminary





 Laboratory











WBC  8.1 X10^3/uL (3.6-10.0)   11/12/17  04:10    


 


RBC  3.79 X10^6/uL (3.5-5.4)   11/12/17  04:10    


 


Hgb  11.8 g/dL (12.0-16.0)  L  11/12/17  04:10    


 


Hct  34.7 % (36.0-47.0)  L  11/12/17  04:10    


 


MCV  91.5 fL (80.0-100.0)   11/12/17  04:10    


 


MCH  31.1 pg (27.0-34.0)   11/12/17  04:10    


 


MCHC  34.0 g/dL (33.0-35.0)   11/12/17  04:10    


 


RDW  14.4 % (11.6-16.5)   11/12/17  04:10    


 


Plt Count  271 X10^3/uL (150.0-450.0)   11/12/17  04:10    


 


Plt Count Comment  Adequate  (ADEQUATE)   11/08/17  05:35    


 


MPV  8.1 fL (7.4-11.0)   11/12/17  04:10    


 


Neut %  74.1 % (42.0-75.0)   11/12/17  04:10    


 


Lymph %  14.2 % (21.0-51.0)  L  11/12/17  04:10    


 


Mono %  9.2 % (0.0-13.0)   11/12/17  04:10    


 


Eos %  2.1 % (0.9-2.9)   11/12/17  04:10    


 


Baso %  0.4 % (0.2-1.0)   11/12/17  04:10    


 


Neut #  6.0 x10^3/uL (2.2-4.8)  H  11/12/17  04:10    


 


Lymph #  1.2 X10^3/uL (1.3-2.9)  L  11/12/17  04:10    


 


Mono #  0.7 x10^3/uL (0.3-0.8)   11/12/17  04:10    


 


Eos #  0.2 x10^3/uL (0.0-0.2)   11/12/17  04:10    


 


Baso #  0.0 X10^3/uL (0.0-0.1)   11/12/17  04:10    


 


Absolute Nucleated RBC  0.2 /100WBC  11/12/17  04:10    


 


Total Counted  100   11/08/17  05:35    


 


Neutrophils % (Manual)  71 % (39-76)   11/08/17  05:35    


 


Lymphocytes % (Manual)  23 % (13-43)   11/08/17  05:35    


 


Monocytes % (Manual)  6 % (4-9)   11/08/17  05:35    


 


Plt Morphology Comment  Normal  (NORMAL)   11/08/17  05:35    


 


RBC Morphology  Normal  (NORMAL)   11/08/17  05:35    


 


INR Target Range  -   11/08/17  11:58    


 


INR  1.16  (0.8-1.3)   11/08/17  11:58    


 


PTT  33.9 SECONDS (22.9-36.5)   11/11/17  12:45    


 


PTT Comment  -   11/11/17  12:45    


 


Sodium  140 mmol/L (136-145)   11/12/17  04:10    


 


Corrected Sodium  142 mmol/L (136-145)   11/12/17  04:10    


 


Potassium  3.9 mmol/L (3.5-5.1)   11/12/17  18:15    


 


Chloride  108 mmol/L ()  H  11/12/17  04:10    


 


Carbon Dioxide  18.8 mmol/L (21-32)  L  11/12/17  04:10    


 


BUN  11 mg/dL (7-18)   11/12/17  04:10    


 


Creatinine  1.02 mg/dL (0.55-1.02)   11/12/17  09:40    


 


Est GFR (MDRD) Af Amer  > 60  (>60)   11/12/17  04:10    


 


Est GFR (MDRD) Non-Af  51  (>60)  L  11/12/17  04:10    


 


Glucose  168 mg/dL (65-99)  H  11/12/17  04:10    


 


POC Glucose (mg/dL)  141 mg/dL (65-99)  H  11/12/17  16:25    


 


Calcium  9.0 mg/dL (8.5-10.1)   11/12/17  04:10    


 


Corrected Calcium  10.0 mg/dL (8.5-10.1)   11/12/17  04:10    


 


Magnesium  1.5 mg/dL (1.7-2.9)  L  11/12/17  04:10    


 


Total Bilirubin  0.30 mg/dL (0.2-1.0)   11/12/17  04:10    


 


AST  17 Units/L (15-37)   11/12/17  04:10    


 


ALT  14 Units/L (12-78)   11/12/17  04:10    


 


Alkaline Phosphatase  79 Units/L ()   11/12/17  04:10    


 


Ammonia  11 umol/L (11-32)   11/08/17  10:00    


 


Creatine Kinase  93 Units/L ()   11/08/17  16:13    


 


CK-MB (CK-2)  4.5 ng/mL (0-4.0)  H*  11/08/17  16:13    


 


CK/CKMB % Calc  4.8 % (<4)   11/08/17  16:13    


 


Troponin I  0.03 ng/mL (0-1.5)   11/08/17  16:13    


 


Total Protein  6.8 g/dL (6.4-8.2)   11/12/17  04:10    


 


Albumin  2.8 g/dL (3.4-5.0)  L  11/12/17  04:10    


 


Globulin  4.0 g/dL (2.5-4.5)   11/12/17  04:10    


 


Albumin/Globulin Ratio  0.7 Ratio (1.1-2.1)  L  11/12/17  04:10    


 


Specimen Type  Catherized urine   11/07/17  21:04    


 


Urine Color  Yellow  (YELLOW)   11/07/17  21:04    


 


Urine Appearance  Slightly hazy  (CLEAR)   11/07/17  21:04    


 


Urine pH  5.0  (5.0 - 8.0)   11/07/17  21:04    


 


Ur Specific Gravity  1.015  (1.000-1.030)   11/07/17  21:04    


 


Urine Protein  2+  (NEGATIVE)   11/07/17  21:04    


 


Urine Glucose (UA)  2+  (NEGATIVE)   11/07/17  21:04    


 


Urine Ketones  Negative  (NEGATIVE)   11/07/17  21:04    


 


Urine Occult Blood  1+  (NEGATIVE)   11/07/17  21:04    


 


Urine Nitrite  Negative  (NEGATIVE)   11/07/17  21:04    


 


Urine Bilirubin  Negative  (NEGATIVE)   11/07/17  21:04    


 


Urine Urobilinogen  Normal  (NORMAL)   11/07/17  21:04    


 


Ur Leukocyte Esterase  2+  (NEGATIVE)   11/07/17  21:04    


 


Urine RBC  3-5 /HPF (NEGATIVE)   11/07/17  21:04    


 


Urine WBC  10-15 /HPF (NEGATIVE)   11/07/17  21:04    


 


Ur Squamous Epith Cells  Rare /HPF (NEGATIVE)   11/07/17  21:04    


 


Amorphous Sediment  Trace /HPF (NEGATIVE)   11/07/17  21:04    


 


Urine Bacteria  1+ /HPF (NEGATIVE)   11/07/17  21:04    


 


Ur Culture Indicated?  Yes/culture set up   11/07/17  21:04    


 


Tobramycin Trough  0.4 ug/mL (0-2)   11/12/17  09:40    














- Plan


(1) Acute CVA (cerebrovascular accident)


Status: Acute   


Plan: CONTINUE ASPIRIN, CONTINUE PLAVIX, CONTINUE SUPPLEMENTAL OXYGEN, TELEMETRY

, CONTINUE TO MONITOR   





(2) Generalized weakness


Status: Acute   


Plan: CONTINUE NORMAL SALINE, CONTINUE PHYSICAL THERAPY, CONTINUE TO MONITOR

## 2017-11-13 VITALS — DIASTOLIC BLOOD PRESSURE: 63 MMHG | SYSTOLIC BLOOD PRESSURE: 130 MMHG

## 2017-11-13 LAB
ALBUMIN SERPL BCP-MCNC: 2.6 G/DL (ref 3.4–5)
ALP SERPL-CCNC: 72 UNITS/L (ref 46–116)
ALT SERPL W P-5'-P-CCNC: 12 UNITS/L (ref 12–78)
AST SERPL W P-5'-P-CCNC: 24 UNITS/L (ref 15–37)
BASOPHILS # BLD AUTO: 0 X10^3/UL (ref 0–0.1)
BASOPHILS NFR BLD AUTO: 0.6 % (ref 0.2–1)
BUN SERPL-MCNC: 16 MG/DL (ref 7–18)
CALCIUM ALBUM COR SERPL-SCNC: 139 MMOL/L (ref 136–145)
CALCIUM ALBUM COR SERPL-SCNC: 9.7 MG/DL (ref 8.5–10.1)
CALCIUM SERPL-MCNC: 8.6 MG/DL (ref 8.5–10.1)
CHLORIDE SERPL-SCNC: 110 MMOL/L (ref 98–107)
CO2 SERPL-SCNC: 11.9 MMOL/L (ref 21–32)
CREAT SERPL-MCNC: 0.89 MG/DL (ref 0.55–1.02)
EGFR  BLACK RACES: > 60 (ref 60–?)
EOSINOPHIL # BLD AUTO: 0.1 X10^3/UL (ref 0–0.2)
EOSINOPHIL NFR BLD AUTO: 1.6 % (ref 0.9–2.9)
ERYTHROCYTE [DISTWIDTH] IN BLOOD BY AUTOMATED COUNT: 14.4 % (ref 11.6–16.5)
HCT VFR BLD AUTO: 35 % (ref 36–47)
HGB BLD-MCNC: 11.9 G/DL (ref 12–16)
LYMPHOCYTES # BLD AUTO: 1.6 X10^3/UL (ref 1.3–2.9)
LYMPHOCYTES NFR BLD AUTO: 18.8 % (ref 21–51)
MAGNESIUM SERPL-MCNC: 1.8 MG/DL (ref 1.7–2.9)
MCH RBC QN AUTO: 31.1 PG (ref 27–34)
MCHC RBC AUTO-ENTMCNC: 34 G/DL (ref 33–35)
MCV RBC AUTO: 91.6 FL (ref 80–100)
MONOCYTES # BLD AUTO: 0.7 X10^3/UL (ref 0.3–0.8)
MONOCYTES NFR BLD AUTO: 8.5 % (ref 0–13)
NEUTROPHILS # BLD AUTO: 6 X10^3/UL (ref 2.2–4.8)
NEUTROPHILS NFR BLD AUTO: 70.5 % (ref 42–75)
PLATELET # BLD: 269 X10^3/UL (ref 150–450)
PMV BLD AUTO: 8 FL (ref 7.4–11)
PROT SERPL-MCNC: 6.6 G/DL (ref 6.4–8.2)
RBC # BLD AUTO: 3.82 X10^6/UL (ref 3.5–5.4)
SODIUM SERPL-SCNC: 137 MMOL/L (ref 136–145)
WBC NRBC COR # BLD AUTO: 8.5 X10^3/UL (ref 3.6–10)

## 2017-11-13 RX ADMIN — ASPIRIN SCH MG: 81 TABLET, COATED ORAL at 11:07

## 2017-11-13 RX ADMIN — CLOPIDOGREL BISULFATE SCH MG: 75 TABLET ORAL at 11:07

## 2017-11-13 RX ADMIN — ATENOLOL SCH MG: 25 TABLET ORAL at 11:07

## 2017-11-13 RX ADMIN — SODIUM CHLORIDE SCH MLS/HR: 900 INJECTION INTRAVENOUS at 11:08

## 2017-11-13 RX ADMIN — AMLODIPINE BESYLATE SCH MG: 10 TABLET ORAL at 11:07

## 2017-11-23 ENCOUNTER — HOSPITAL ENCOUNTER (INPATIENT)
Dept: HOSPITAL 24 - ER | Age: 72
LOS: 2 days | Discharge: HOME | DRG: 194 | End: 2017-11-25
Attending: INTERNAL MEDICINE | Admitting: INTERNAL MEDICINE
Payer: COMMERCIAL

## 2017-11-23 VITALS — BODY MASS INDEX: 15.9 KG/M2

## 2017-11-23 DIAGNOSIS — E11.65: ICD-10-CM

## 2017-11-23 DIAGNOSIS — E87.1: ICD-10-CM

## 2017-11-23 DIAGNOSIS — R53.1: ICD-10-CM

## 2017-11-23 DIAGNOSIS — J18.1: Primary | ICD-10-CM

## 2017-11-23 DIAGNOSIS — N30.01: ICD-10-CM

## 2017-11-23 DIAGNOSIS — E87.2: ICD-10-CM

## 2017-11-23 DIAGNOSIS — Z79.4: ICD-10-CM

## 2017-11-23 DIAGNOSIS — R26.89: ICD-10-CM

## 2017-11-23 DIAGNOSIS — F41.8: ICD-10-CM

## 2017-11-23 DIAGNOSIS — R41.82: ICD-10-CM

## 2017-11-23 DIAGNOSIS — K94.03: ICD-10-CM

## 2017-11-23 DIAGNOSIS — E87.5: ICD-10-CM

## 2017-11-23 DIAGNOSIS — K21.9: ICD-10-CM

## 2017-11-23 DIAGNOSIS — Z87.440: ICD-10-CM

## 2017-11-23 DIAGNOSIS — B96.89: ICD-10-CM

## 2017-11-23 DIAGNOSIS — R13.11: ICD-10-CM

## 2017-11-23 DIAGNOSIS — J44.9: ICD-10-CM

## 2017-11-23 DIAGNOSIS — I10: ICD-10-CM

## 2017-11-23 DIAGNOSIS — I25.10: ICD-10-CM

## 2017-11-23 DIAGNOSIS — E86.0: ICD-10-CM

## 2017-11-23 LAB
ALBUMIN SERPL BCP-MCNC: 3.4 G/DL (ref 3.4–5)
ALP SERPL-CCNC: 99 UNITS/L (ref 46–116)
ALT SERPL W P-5'-P-CCNC: 15 UNITS/L (ref 12–78)
AST SERPL W P-5'-P-CCNC: 20 UNITS/L (ref 15–37)
BACTERIA URNS QL MICRO: (no result) /HPF
BASOPHILS # BLD AUTO: 0.4 X10^3/UL (ref 0–0.1)
BASOPHILS NFR BLD AUTO: 1.6 % (ref 0.2–1)
BILIRUB UR QL STRIP.AUTO: NEGATIVE
BUN SERPL-MCNC: 23 MG/DL (ref 7–18)
CALCIUM ALBUM COR SERPL-SCNC: (no result) MG/DL (ref 8.5–10.1)
CALCIUM ALBUM COR SERPL-SCNC: 139 MMOL/L (ref 136–145)
CALCIUM SERPL-MCNC: 9.4 MG/DL (ref 8.5–10.1)
CHLORIDE SERPL-SCNC: 101 MMOL/L (ref 98–107)
CLARITY UR: (no result)
CO2 SERPL-SCNC: 13.7 MMOL/L (ref 21–32)
COLOR UR AUTO: YELLOW
CREAT SERPL-MCNC: 1.41 MG/DL (ref 0.55–1.02)
EGFR  BLACK RACES: 47 (ref 60–?)
EOSINOPHIL # BLD AUTO: 0 X10^3/UL (ref 0–0.2)
EOSINOPHIL NFR BLD AUTO: 0 % (ref 0.9–2.9)
ERYTHROCYTE [DISTWIDTH] IN BLOOD BY AUTOMATED COUNT: 15.6 % (ref 11.6–16.5)
GLUCOSE UR QL STRIP.AUTO: NEGATIVE
HCT VFR BLD AUTO: 43.4 % (ref 36–47)
HGB BLD-MCNC: 14.4 G/DL (ref 12–16)
KETONES UR QL STRIP.AUTO: NEGATIVE
LEUKOCYTE ESTERASE UR QL STRIP.AUTO: (no result)
LYMPHOCYTES # BLD AUTO: 1.9 X10^3/UL (ref 1.3–2.9)
LYMPHOCYTES NFR BLD AUTO: 8.3 % (ref 21–51)
MCH RBC QN AUTO: 30.8 PG (ref 27–34)
MCHC RBC AUTO-ENTMCNC: 33.2 G/DL (ref 33–35)
MCV RBC AUTO: 92.9 FL (ref 80–100)
MONOCYTES # BLD AUTO: 1.4 X10^3/UL (ref 0.3–0.8)
MONOCYTES NFR BLD AUTO: 5.9 % (ref 0–13)
NEUTROPHILS # BLD AUTO: 19.3 X10^3/UL (ref 2.2–4.8)
NEUTROPHILS NFR BLD AUTO: 84.2 % (ref 42–75)
NEUTS BAND NFR BLD: 2 % (ref 0–10)
NITRITE UR QL STRIP.AUTO: NEGATIVE
PH UR STRIP.AUTO: 5 [PH] (ref 5–8)
PLAT MORPH BLD: NORMAL
PLATELET # BLD: 188 X10^3/UL (ref 150–450)
PMV BLD AUTO: 9.1 FL (ref 7.4–11)
PROT SERPL-MCNC: 8 G/DL (ref 6.4–8.2)
PROT UR QL STRIP.AUTO: (no result)
RBC # BLD AUTO: 4.67 X10^6/UL (ref 3.5–5.4)
RBC # UR STRIP.AUTO: (no result) /UL
RBC MORPH BLD: NORMAL
SODIUM SERPL-SCNC: 133 MMOL/L (ref 136–145)
SP GR UR STRIP.AUTO: 1.02 (ref 1–1.03)
SQUAMOUS URNS QL MICRO: (no result) /HPF
UROBILINOGEN UR QL STRIP.AUTO: NORMAL
WBC NRBC COR # BLD AUTO: 23 X10^3/UL (ref 3.6–10)

## 2017-11-23 PROCEDURE — 71010: CPT

## 2017-11-23 PROCEDURE — 94760 N-INVAS EAR/PLS OXIMETRY 1: CPT

## 2017-11-23 PROCEDURE — 87186 SC STD MICRODIL/AGAR DIL: CPT

## 2017-11-23 PROCEDURE — 85025 COMPLETE CBC W/AUTO DIFF WBC: CPT

## 2017-11-23 PROCEDURE — 87077 CULTURE AEROBIC IDENTIFY: CPT

## 2017-11-23 PROCEDURE — 87086 URINE CULTURE/COLONY COUNT: CPT

## 2017-11-23 PROCEDURE — 99238 HOSP IP/OBS DSCHRG MGMT 30/<: CPT

## 2017-11-23 PROCEDURE — 99231 SBSQ HOSP IP/OBS SF/LOW 25: CPT

## 2017-11-23 PROCEDURE — 99221 1ST HOSP IP/OBS SF/LOW 40: CPT

## 2017-11-23 PROCEDURE — 51702 INSERT TEMP BLADDER CATH: CPT

## 2017-11-23 PROCEDURE — 82947 ASSAY GLUCOSE BLOOD QUANT: CPT

## 2017-11-23 PROCEDURE — 96365 THER/PROPH/DIAG IV INF INIT: CPT

## 2017-11-23 PROCEDURE — 36415 COLL VENOUS BLD VENIPUNCTURE: CPT

## 2017-11-23 PROCEDURE — 83605 ASSAY OF LACTIC ACID: CPT

## 2017-11-23 PROCEDURE — 99282 EMERGENCY DEPT VISIT SF MDM: CPT

## 2017-11-23 PROCEDURE — 99284 EMERGENCY DEPT VISIT MOD MDM: CPT

## 2017-11-23 PROCEDURE — 87070 CULTURE OTHR SPECIMN AEROBIC: CPT

## 2017-11-23 PROCEDURE — 96374 THER/PROPH/DIAG INJ IV PUSH: CPT

## 2017-11-23 PROCEDURE — 83735 ASSAY OF MAGNESIUM: CPT

## 2017-11-23 PROCEDURE — 94640 AIRWAY INHALATION TREATMENT: CPT

## 2017-11-23 PROCEDURE — 80053 COMPREHEN METABOLIC PANEL: CPT

## 2017-11-23 PROCEDURE — 87040 BLOOD CULTURE FOR BACTERIA: CPT

## 2017-11-23 PROCEDURE — 87205 SMEAR GRAM STAIN: CPT

## 2017-11-23 PROCEDURE — 81001 URINALYSIS AUTO W/SCOPE: CPT

## 2017-11-23 RX ADMIN — IPRATROPIUM BROMIDE AND ALBUTEROL SULFATE SCH EA: .5; 3 SOLUTION RESPIRATORY (INHALATION) at 20:54

## 2017-11-23 RX ADMIN — GUAIFENESIN DEXTROMETHORPHAN HYDROBROMIDE ORAL SOLUTION SCH: 200; 20 SOLUTION ORAL at 16:59

## 2017-11-23 RX ADMIN — GUAIFENESIN DEXTROMETHORPHAN HYDROBROMIDE ORAL SOLUTION SCH ML: 200; 20 SOLUTION ORAL at 14:44

## 2017-11-23 RX ADMIN — SODIUM CHLORIDE SCH MLS/HR: 9 INJECTION, SOLUTION INTRAVENOUS at 20:24

## 2017-11-23 RX ADMIN — GUAIFENESIN DEXTROMETHORPHAN HYDROBROMIDE ORAL SOLUTION SCH ML: 200; 20 SOLUTION ORAL at 21:35

## 2017-11-23 RX ADMIN — SODIUM CHLORIDE SCH MLS/HR: 9 INJECTION, SOLUTION INTRAVENOUS at 10:42

## 2017-11-23 RX ADMIN — IPRATROPIUM BROMIDE AND ALBUTEROL SULFATE SCH: .5; 3 SOLUTION RESPIRATORY (INHALATION) at 12:24

## 2017-11-23 RX ADMIN — IPRATROPIUM BROMIDE AND ALBUTEROL SULFATE SCH EA: .5; 3 SOLUTION RESPIRATORY (INHALATION) at 16:47

## 2017-11-23 NOTE — RAD
Examination: Portable AP chest



History: Wheezing



Comparison reference: 11/17/2017



Findings: Continued normal heart size. The right lung remains clear. There is interval development of
 retrocardiac density with blunting of the costophrenic angle. The left upper lung is clear. No pneum
othorax is seen.



Impression: Interval development of pleural-parenchymal opacity at the left base consistent with airs
pace disease in the left lower lobe, and pleural fluid. Findings may represent pneumonia and/or atele
ctasis. 



Reported By:Electronically Signed by AROLDO CONN MD at 11/23/2017 11:08:55 AM

## 2017-11-23 NOTE — DR.GENAD
HPI





- PCP


Primary Care Physician: marleny





- Complaint/Symptoms


Chief Complaint Doctors Comments: Care giver reports that patient was wheezing 

and chocked on water this morning. Patient was discharged from hospital on the 

8th of Noverber s/p CVA in late October. Her sister-in-law reports that patient 

was wheezing this morning and chocked when she was given water.


Chief Complaint:: family stated she has been wheezing and she cant 

swollow.[facility] last night[facility]





- Source


History Provided: Family Member





- Mode of Arrival


Mode of Arrival: Wheelchair





- Timing


Onset of Chief Complaint: 11/22/17





PMH





- PMH


Past Medical History: Yes


Past Medical History: Anxiety, COPD, Coronary Artery Disease, Depression, 

Diabetes, GERD, Hypertension, MI


Past Surgical History: Yes


Surgical History: Bowel Resection, CABG/Valve Surgery





- Family History


History of Family Medical Conditions: Yes


Family Medical History: Diabetes Mellitus, Cancer, Coronary Artery Disease





- Social History


Does patient currently use any type of tobacco product: No


Have you used tobacco products in the last 12 months: No


Does any household member use tobacco: Yes


Alcohol Use: None


Do you use any recreational Drugs:: No


Lives With: Family


Lives Where: Home





- infectious screening


In the last 2 months have you had wt loss of >10#?: NO


Have you had fever, night sweats or hemotysis?: No


Have you traveled outside the country in the last 6 months?: No


Isolation: Standard





ROS





- Review of Systems


Eyes: No Symptoms Reported


ENTM: No Symptoms Reported


Respiratoy: No Symptoms Reported


Cardiovascular: No Symptoms Reported


Gastrointestinal/Abdominal: No Symptoms Reported


Genitourinary: No Symptoms Reported


Neurological: No Symptoms Reported


Musculoskeletal: No Symptoms Reported


Integumentary: Dryness


Endocrine: No Symptoms Reported


Psychiatric: No Symptoms Reported


All Other Systems: Reviewed and Negative





PE





- Vital Signs


Vitals: 


 





Temperature                      98.9 F


Pulse Rate                       87


Respiratory Rate                 18


Blood Pressure [Right Arm]       137/51


Blood Pressure [Left Arm]        130/63


Blood Pressure                   128/60


O2 Sat by Pulse Oximetry         98











- General


General Appearance: Lethargic





- Head


Head Exam: Normal Inspection





- Eyes


Eye exam: Normal Appearance, PERRL, EOMI





- ENT


ENT Exam: Mucous Membranes Dry


External Ear Exam: Normal External Inspection


TM/Canal Exam: Bilateral Normal


Nose Exam: Normal Nose Exam


Mouth Exam: Trismus.  negative: Drooling, Lip Swelling, Tongue Elevation, 

Tongue Swelling





- Chest


Chest Inspection: Normal Inspection, Symmetric Chest Wall Rise





- Respiratory


Respiratory Exam: Normal Lung Sounds Bilat


Respiratory Exam: Bilateral Clear to Auscultation





- Cardiovascular


Cardiovascular Exam: Regular Rate





- Abdominal Exam


Abdominal Exam: Normal Inspection


Abdominal Tenderness: negative: RUQ, RLQ, LUQ, LLQ, Epigastrium, Suprapubic, 

Diffuse, Mild, Moderate, Severe, Other





- Extremities


Extremities Exam: Normal Inspection, Full ROM





- Back


Back Exam: Normal Inspection





- Neurologic


Neurological Exam: Alert





- Psychiatric


Psychiatric Exam: Normal Affect





- Skin


Skin Exam: Warm, Dry, Intact





Course





- Treatment


Treatment: see orders





- Consultation


Called: 10:30 (Dr Hudson agreed to admit for further treatment and evalaution)





ROR





- Labs Reviewed


Result Diagrams: 


 11/23/17 10:50





 11/23/17 10:50


Laboratory: 


 











WBC  23.0 X10^3/uL (3.6-10.0)  H  11/23/17  10:50    


 


RBC  4.67 X10^6/uL (3.5-5.4)   11/23/17  10:50    


 


Hgb  14.4 g/dL (12.0-16.0)   11/23/17  10:50    


 


Hct  43.4 % (36.0-47.0)   11/23/17  10:50    


 


MCV  92.9 fL (80.0-100.0)   11/23/17  10:50    


 


MCH  30.8 pg (27.0-34.0)   11/23/17  10:50    


 


MCHC  33.2 g/dL (33.0-35.0)   11/23/17  10:50    


 


RDW  15.6 % (11.6-16.5)   11/23/17  10:50    


 


Plt Count  188 X10^3/uL (150.0-450.0)   11/23/17  10:50    


 


Plt Count Comment  Adequate  (ADEQUATE)   11/23/17  10:50    


 


MPV  9.1 fL (7.4-11.0)   11/23/17  10:50    


 


Neut %  84.2 % (42.0-75.0)  H  11/23/17  10:50    


 


Lymph %  8.3 % (21.0-51.0)  L  11/23/17  10:50    


 


Mono %  5.9 % (0.0-13.0)   11/23/17  10:50    


 


Eos %  0.0 % (0.9-2.9)  L  11/23/17  10:50    


 


Baso %  1.6 % (0.2-1.0)  H  11/23/17  10:50    


 


Neut #  19.3 x10^3/uL (2.2-4.8)  H  11/23/17  10:50    


 


Lymph #  1.9 X10^3/uL (1.3-2.9)   11/23/17  10:50    


 


Mono #  1.4 x10^3/uL (0.3-0.8)  H  11/23/17  10:50    


 


Eos #  0.0 x10^3/uL (0.0-0.2)   11/23/17  10:50    


 


Baso #  0.4 X10^3/uL (0.0-0.1)  H  11/23/17  10:50    


 


Absolute Nucleated RBC  0.0 /100WBC  11/23/17  10:50    


 


Total Counted  100   11/23/17  10:50    


 


Neutrophils % (Manual)  85 % (39-76)  H  11/23/17  10:50    


 


Band Neutrophils %  2 % (0-10)   11/23/17  10:50    


 


Lymphocytes % (Manual)  10 % (13-43)  L  11/23/17  10:50    


 


Monocytes % (Manual)  3 % (4-9)  L  11/23/17  10:50    


 


Plt Morphology Comment  Normal  (NORMAL)   11/23/17  10:50    


 


RBC Morphology  Normal  (NORMAL)   11/23/17  10:50    


 


Sodium  133 mmol/L (136-145)  L  11/23/17  10:50    


 


Corrected Sodium  139 mmol/L (136-145)   11/23/17  10:50    


 


Potassium  5.3 mmol/L (3.5-5.1)  H  11/23/17  10:50    


 


Chloride  101 mmol/L ()   11/23/17  10:50    


 


Carbon Dioxide  13.7 mmol/L (21-32)  L*  11/23/17  10:50    


 


BUN  23 mg/dL (7-18)  H  11/23/17  10:50    


 


Creatinine  1.41 mg/dL (0.55-1.02)  H  11/23/17  10:50    


 


Est GFR (MDRD) Af Amer  47  (>60)  L  11/23/17  10:50    


 


Est GFR (MDRD) Non-Af  39  (>60)  L  11/23/17  10:50    


 


Glucose  340 mg/dL (65-99)  H  11/23/17  10:50    


 


Calcium  9.4 mg/dL (8.5-10.1)   11/23/17  10:50    


 


Corrected Calcium  TNP   11/23/17  10:50    


 


Total Bilirubin  0.60 mg/dL (0.2-1.0)   11/23/17  10:50    


 


AST  20 Units/L (15-37)   11/23/17  10:50    


 


ALT  15 Units/L (12-78)   11/23/17  10:50    


 


Alkaline Phosphatase  99 Units/L ()   11/23/17  10:50    


 


Total Protein  8.0 g/dL (6.4-8.2)   11/23/17  10:50    


 


Albumin  3.4 g/dL (3.4-5.0)   11/23/17  10:50    


 


Globulin  4.6 g/dL (2.5-4.5)  H  11/23/17  10:50    


 


Albumin/Globulin Ratio  0.7 Ratio (1.1-2.1)  L  11/23/17  10:50    


 


Specimen Type  Clean catch urine   11/23/17  11:05    


 


Urine Color  Yellow  (YELLOW)   11/23/17  11:05    


 


Urine Appearance  Cloudy  (CLEAR)   11/23/17  11:05    


 


Urine pH  5.0  (5.0 - 8.0)   11/23/17  11:05    


 


Ur Specific Gravity  1.020  (1.000-1.030)   11/23/17  11:05    


 


Urine Protein  2+  (NEGATIVE)   11/23/17  11:05    


 


Urine Glucose (UA)  Negative  (NEGATIVE)   11/23/17  11:05    


 


Urine Ketones  Negative  (NEGATIVE)   11/23/17  11:05    


 


Urine Occult Blood  3+  (NEGATIVE)   11/23/17  11:05    


 


Urine Nitrite  Negative  (NEGATIVE)   11/23/17  11:05    


 


Urine Bilirubin  Negative  (NEGATIVE)   11/23/17  11:05    


 


Urine Urobilinogen  Normal  (NORMAL)   11/23/17  11:05    


 


Ur Leukocyte Esterase  3+  (NEGATIVE)   11/23/17  11:05    


 


Urine RBC  10-15 /HPF (NEGATIVE)   11/23/17  11:05    


 


Urine WBC  Tntc /HPF (NEGATIVE)   11/23/17  11:05    


 


Ur Squamous Epith Cells  Rare /HPF (NEGATIVE)   11/23/17  11:05    


 


Urine Bacteria  Trace /HPF (NEGATIVE)   11/23/17  11:05    


 


Ur Culture Indicated?  Yes/culture set up   11/23/17  11:05    














- XRAY


XRAY Interpreted by: Radiologist (Chest: continued normal heart size.  The 

right lung remains clear.  There is interval development of retrocardiac 

density with blunting of the costophrenic angle.  The left upper lung is clear  

No pneumothorax Impressin: Interval development of pleural parachymal opacity 

at the left base consistent with airspace disease in the left lower lobe, and 

pleural fluid.  Findings may represent pneumonia and /or atelectasis.)





- Diagnosis


Discharge Problem: 


 Metabolic acidosis, Hyponatremia, Hyperkalemia





LLL pneumonia


Qualifiers:


 Pneumonia type: due to unspecified organism Qualified Code(s): J18.1 - Lobar 

pneumonia, unspecified organism





UTI (urinary tract infection)


Qualifiers:


 Urinary tract infection type: acute cystitis Hematuria presence: with 

hematuria Qualified Code(s): N30.01 - Acute cystitis with hematuria





Diabetes mellitus, type 2


Qualifiers:


 Diabetes mellitus complication status: with hyperglycemia Diabetes mellitus 

long term insulin use: unspecified long term insulin use status Qualified Code(s

): E11.65 - Type 2 diabetes mellitus with hyperglycemia








- Discharge Plan


Condition: Stable





- Follow ups/Referrals


Follow ups/Referrals: 


KRIS HUDSON [Primary Care Provider] - 3 days





- Instructions

## 2017-11-24 LAB
ALBUMIN SERPL BCP-MCNC: 2.7 G/DL (ref 3.4–5)
ALP SERPL-CCNC: 82 UNITS/L (ref 46–116)
ALT SERPL W P-5'-P-CCNC: 9 UNITS/L (ref 12–78)
AST SERPL W P-5'-P-CCNC: 17 UNITS/L (ref 15–37)
BASOPHILS # BLD AUTO: 0 X10^3/UL (ref 0–0.1)
BASOPHILS NFR BLD AUTO: 0.2 % (ref 0.2–1)
BUN SERPL-MCNC: 16 MG/DL (ref 7–18)
CALCIUM ALBUM COR SERPL-SCNC: 140 MMOL/L (ref 136–145)
CALCIUM ALBUM COR SERPL-SCNC: 9.9 MG/DL (ref 8.5–10.1)
CALCIUM SERPL-MCNC: 8.9 MG/DL (ref 8.5–10.1)
CHLORIDE SERPL-SCNC: 106 MMOL/L (ref 98–107)
CO2 SERPL-SCNC: 12.9 MMOL/L (ref 21–32)
CREAT SERPL-MCNC: 1.09 MG/DL (ref 0.55–1.02)
EGFR  BLACK RACES: > 60 (ref 60–?)
EOSINOPHIL # BLD AUTO: 0 X10^3/UL (ref 0–0.2)
EOSINOPHIL NFR BLD AUTO: 0.1 % (ref 0.9–2.9)
ERYTHROCYTE [DISTWIDTH] IN BLOOD BY AUTOMATED COUNT: 15.4 % (ref 11.6–16.5)
HCT VFR BLD AUTO: 37 % (ref 36–47)
HGB BLD-MCNC: 12.3 G/DL (ref 12–16)
LYMPHOCYTES # BLD AUTO: 0.8 X10^3/UL (ref 1.3–2.9)
LYMPHOCYTES NFR BLD AUTO: 6.1 % (ref 21–51)
MCH RBC QN AUTO: 30.8 PG (ref 27–34)
MCHC RBC AUTO-ENTMCNC: 33.3 G/DL (ref 33–35)
MCV RBC AUTO: 92.5 FL (ref 80–100)
MONOCYTES # BLD AUTO: 0.6 X10^3/UL (ref 0.3–0.8)
MONOCYTES NFR BLD AUTO: 5 % (ref 0–13)
NEUTROPHILS # BLD AUTO: 11.3 X10^3/UL (ref 2.2–4.8)
NEUTROPHILS NFR BLD AUTO: 88.6 % (ref 42–75)
PLATELET # BLD: 160 X10^3/UL (ref 150–450)
PMV BLD AUTO: 9.3 FL (ref 7.4–11)
PROT SERPL-MCNC: 6.6 G/DL (ref 6.4–8.2)
RBC # BLD AUTO: 4 X10^6/UL (ref 3.5–5.4)
SODIUM SERPL-SCNC: 136 MMOL/L (ref 136–145)
WBC NRBC COR # BLD AUTO: 12.7 X10^3/UL (ref 3.6–10)

## 2017-11-24 RX ADMIN — GUAIFENESIN DEXTROMETHORPHAN HYDROBROMIDE ORAL SOLUTION SCH ML: 200; 20 SOLUTION ORAL at 08:08

## 2017-11-24 RX ADMIN — IPRATROPIUM BROMIDE AND ALBUTEROL SULFATE SCH EA: .5; 3 SOLUTION RESPIRATORY (INHALATION) at 09:45

## 2017-11-24 RX ADMIN — GUAIFENESIN DEXTROMETHORPHAN HYDROBROMIDE ORAL SOLUTION SCH ML: 200; 20 SOLUTION ORAL at 12:05

## 2017-11-24 RX ADMIN — IPRATROPIUM BROMIDE AND ALBUTEROL SULFATE SCH EA: .5; 3 SOLUTION RESPIRATORY (INHALATION) at 12:03

## 2017-11-24 RX ADMIN — IPRATROPIUM BROMIDE AND ALBUTEROL SULFATE SCH EA: .5; 3 SOLUTION RESPIRATORY (INHALATION) at 01:39

## 2017-11-24 RX ADMIN — SODIUM CHLORIDE SCH: 9 INJECTION, SOLUTION INTRAVENOUS at 13:05

## 2017-11-24 RX ADMIN — IPRATROPIUM BROMIDE AND ALBUTEROL SULFATE SCH EA: .5; 3 SOLUTION RESPIRATORY (INHALATION) at 05:24

## 2017-11-24 RX ADMIN — SODIUM CHLORIDE SCH MLS/HR: 9 INJECTION, SOLUTION INTRAVENOUS at 11:43

## 2017-11-24 RX ADMIN — IPRATROPIUM BROMIDE AND ALBUTEROL SULFATE SCH EA: .5; 3 SOLUTION RESPIRATORY (INHALATION) at 16:26

## 2017-11-24 RX ADMIN — GUAIFENESIN DEXTROMETHORPHAN HYDROBROMIDE ORAL SOLUTION SCH ML: 200; 20 SOLUTION ORAL at 20:49

## 2017-11-24 RX ADMIN — SODIUM CHLORIDE SCH MLS/HR: 9 INJECTION, SOLUTION INTRAVENOUS at 04:08

## 2017-11-24 RX ADMIN — IPRATROPIUM BROMIDE AND ALBUTEROL SULFATE SCH EA: .5; 3 SOLUTION RESPIRATORY (INHALATION) at 20:20

## 2017-11-24 RX ADMIN — GUAIFENESIN DEXTROMETHORPHAN HYDROBROMIDE ORAL SOLUTION SCH: 200; 20 SOLUTION ORAL at 16:52

## 2017-11-24 NOTE — RAD
Examination: Portable AP chest



History: Wheezing



Comparison reference: 11/23/2017



Findings: There is again noted extensive retrocardiac opacity consistent with pneumonia or atelectasi
s in the left lower lobe. Apparent shift of the heart and mediastinum to the left may be related to p
atient rotation. The right lung is clear, heart size unchanged.



Impression: Persistent airspace disease left lower lung. A definite pleural effusion is not identifie
d.



Reported By:Electronically Signed by AROLDO CONN MD at 11/24/2017 6:07:03 AM

## 2017-11-25 VITALS — SYSTOLIC BLOOD PRESSURE: 155 MMHG | DIASTOLIC BLOOD PRESSURE: 71 MMHG

## 2017-11-25 LAB
ALBUMIN SERPL BCP-MCNC: 2.5 G/DL (ref 3.4–5)
ALP SERPL-CCNC: 76 UNITS/L (ref 46–116)
ALT SERPL W P-5'-P-CCNC: 12 UNITS/L (ref 12–78)
AST SERPL W P-5'-P-CCNC: 23 UNITS/L (ref 15–37)
BASOPHILS # BLD AUTO: 0 X10^3/UL (ref 0–0.1)
BASOPHILS NFR BLD AUTO: 0.3 % (ref 0.2–1)
BUN SERPL-MCNC: 13 MG/DL (ref 7–18)
CALCIUM ALBUM COR SERPL-SCNC: 140 MMOL/L (ref 136–145)
CALCIUM ALBUM COR SERPL-SCNC: 9.2 MG/DL (ref 8.5–10.1)
CALCIUM SERPL-MCNC: 8 MG/DL (ref 8.5–10.1)
CHLORIDE SERPL-SCNC: 105 MMOL/L (ref 98–107)
CO2 SERPL-SCNC: 17.1 MMOL/L (ref 21–32)
CREAT SERPL-MCNC: 0.93 MG/DL (ref 0.55–1.02)
EGFR  BLACK RACES: > 60 (ref 60–?)
EOSINOPHIL # BLD AUTO: 0.1 X10^3/UL (ref 0–0.2)
EOSINOPHIL NFR BLD AUTO: 1 % (ref 0.9–2.9)
ERYTHROCYTE [DISTWIDTH] IN BLOOD BY AUTOMATED COUNT: 15.2 % (ref 11.6–16.5)
HCT VFR BLD AUTO: 29.4 % (ref 36–47)
HGB BLD-MCNC: 9.9 G/DL (ref 12–16)
LYMPHOCYTES # BLD AUTO: 0.8 X10^3/UL (ref 1.3–2.9)
LYMPHOCYTES NFR BLD AUTO: 7.6 % (ref 21–51)
MCH RBC QN AUTO: 31 PG (ref 27–34)
MCHC RBC AUTO-ENTMCNC: 33.7 G/DL (ref 33–35)
MCV RBC AUTO: 92 FL (ref 80–100)
MONOCYTES # BLD AUTO: 0.5 X10^3/UL (ref 0.3–0.8)
MONOCYTES NFR BLD AUTO: 5.2 % (ref 0–13)
NEUTROPHILS # BLD AUTO: 9 X10^3/UL (ref 2.2–4.8)
NEUTROPHILS NFR BLD AUTO: 85.9 % (ref 42–75)
PLATELET # BLD: 144 X10^3/UL (ref 150–450)
PMV BLD AUTO: 9.3 FL (ref 7.4–11)
PROT SERPL-MCNC: 5.4 G/DL (ref 6.4–8.2)
RBC # BLD AUTO: 3.2 X10^6/UL (ref 3.5–5.4)
SODIUM SERPL-SCNC: 138 MMOL/L (ref 136–145)
WBC NRBC COR # BLD AUTO: 10.5 X10^3/UL (ref 3.6–10)

## 2017-11-25 RX ADMIN — IPRATROPIUM BROMIDE AND ALBUTEROL SULFATE SCH EA: .5; 3 SOLUTION RESPIRATORY (INHALATION) at 17:04

## 2017-11-25 RX ADMIN — IPRATROPIUM BROMIDE AND ALBUTEROL SULFATE SCH EA: .5; 3 SOLUTION RESPIRATORY (INHALATION) at 00:55

## 2017-11-25 RX ADMIN — GUAIFENESIN DEXTROMETHORPHAN HYDROBROMIDE ORAL SOLUTION SCH ML: 200; 20 SOLUTION ORAL at 12:23

## 2017-11-25 RX ADMIN — IPRATROPIUM BROMIDE AND ALBUTEROL SULFATE SCH EA: .5; 3 SOLUTION RESPIRATORY (INHALATION) at 04:30

## 2017-11-25 RX ADMIN — GUAIFENESIN DEXTROMETHORPHAN HYDROBROMIDE ORAL SOLUTION SCH ML: 200; 20 SOLUTION ORAL at 21:05

## 2017-11-25 RX ADMIN — GUAIFENESIN DEXTROMETHORPHAN HYDROBROMIDE ORAL SOLUTION SCH ML: 200; 20 SOLUTION ORAL at 17:03

## 2017-11-25 RX ADMIN — SODIUM CHLORIDE SCH: 9 INJECTION, SOLUTION INTRAVENOUS at 17:23

## 2017-11-25 RX ADMIN — IPRATROPIUM BROMIDE AND ALBUTEROL SULFATE SCH EA: .5; 3 SOLUTION RESPIRATORY (INHALATION) at 09:02

## 2017-11-25 RX ADMIN — SODIUM CHLORIDE SCH: 9 INJECTION, SOLUTION INTRAVENOUS at 07:50

## 2017-11-25 RX ADMIN — GUAIFENESIN DEXTROMETHORPHAN HYDROBROMIDE ORAL SOLUTION SCH ML: 200; 20 SOLUTION ORAL at 09:02

## 2017-11-25 RX ADMIN — IPRATROPIUM BROMIDE AND ALBUTEROL SULFATE SCH EA: .5; 3 SOLUTION RESPIRATORY (INHALATION) at 12:22

## 2018-02-02 ENCOUNTER — HOSPITAL ENCOUNTER (INPATIENT)
Dept: HOSPITAL 24 - ER | Age: 73
LOS: 5 days | Discharge: NURSING, REHAB OR LONG-TERM CARE | DRG: 948 | End: 2018-02-07
Attending: INTERNAL MEDICINE | Admitting: INTERNAL MEDICINE
Payer: COMMERCIAL

## 2018-02-02 VITALS — BODY MASS INDEX: 18.8 KG/M2

## 2018-02-02 DIAGNOSIS — F32.89: ICD-10-CM

## 2018-02-02 DIAGNOSIS — J44.9: ICD-10-CM

## 2018-02-02 DIAGNOSIS — B95.62: ICD-10-CM

## 2018-02-02 DIAGNOSIS — R41.82: Primary | ICD-10-CM

## 2018-02-02 DIAGNOSIS — E11.65: ICD-10-CM

## 2018-02-02 DIAGNOSIS — E87.1: ICD-10-CM

## 2018-02-02 DIAGNOSIS — I25.10: ICD-10-CM

## 2018-02-02 DIAGNOSIS — E86.0: ICD-10-CM

## 2018-02-02 DIAGNOSIS — R62.7: ICD-10-CM

## 2018-02-02 DIAGNOSIS — F41.8: ICD-10-CM

## 2018-02-02 DIAGNOSIS — R06.02: ICD-10-CM

## 2018-02-02 DIAGNOSIS — Z86.73: ICD-10-CM

## 2018-02-02 DIAGNOSIS — L89.152: ICD-10-CM

## 2018-02-02 DIAGNOSIS — Z93.3: ICD-10-CM

## 2018-02-02 DIAGNOSIS — Z79.4: ICD-10-CM

## 2018-02-02 DIAGNOSIS — K21.9: ICD-10-CM

## 2018-02-02 DIAGNOSIS — R94.31: ICD-10-CM

## 2018-02-02 DIAGNOSIS — N39.0: ICD-10-CM

## 2018-02-02 LAB
ALBUMIN SERPL BCP-MCNC: 3.7 G/DL (ref 3.4–5)
ALP SERPL-CCNC: 109 UNITS/L (ref 46–116)
ALT SERPL W P-5'-P-CCNC: 24 UNITS/L (ref 12–78)
AMORPH SED URNS QL MICRO: (no result) /HPF
AST SERPL W P-5'-P-CCNC: 16 UNITS/L (ref 15–37)
BACTERIA URNS QL MICRO: (no result) /HPF
BASOPHILS # BLD AUTO: 0.1 X10^3/UL (ref 0–0.1)
BASOPHILS NFR BLD AUTO: 0.7 % (ref 0.2–1)
BILIRUB UR QL STRIP.AUTO: NEGATIVE
BUN SERPL-MCNC: 17 MG/DL (ref 7–18)
CALCIUM ALBUM COR SERPL-SCNC: (no result) MG/DL (ref 8.5–10.1)
CALCIUM ALBUM COR SERPL-SCNC: 138 MMOL/L (ref 136–145)
CALCIUM SERPL-MCNC: 9.5 MG/DL (ref 8.5–10.1)
CHLORIDE SERPL-SCNC: 101 MMOL/L (ref 98–107)
CK MB SERPL-MCNC: 5.3 NG/ML (ref 0–4)
CK MB SERPL-MCNC: 5.5 NG/ML (ref 0–4)
CK SERPL-CCNC: 59 UNITS/L (ref 26–192)
CK SERPL-CCNC: 67 UNITS/L (ref 26–192)
CKMB %: 8.2 % (ref ?–4)
CKMB %: 9 % (ref ?–4)
CLARITY UR: (no result)
CO2 SERPL-SCNC: 21.8 MMOL/L (ref 21–32)
COLOR UR AUTO: YELLOW
CREAT SERPL-MCNC: 1.32 MG/DL (ref 0.55–1.02)
EGFR  BLACK RACES: 51 (ref 60–?)
EOSINOPHIL # BLD AUTO: 0.3 X10^3/UL (ref 0–0.2)
EOSINOPHIL NFR BLD AUTO: 3.8 % (ref 0.9–2.9)
ERYTHROCYTE [DISTWIDTH] IN BLOOD BY AUTOMATED COUNT: 15 % (ref 11.6–16.5)
GLUCOSE UR QL STRIP.AUTO: (no result)
HCT VFR BLD AUTO: 44.4 % (ref 36–47)
HGB BLD-MCNC: 15.2 G/DL (ref 12–16)
KETONES UR QL STRIP.AUTO: NEGATIVE
LEUKOCYTE ESTERASE UR QL STRIP.AUTO: (no result)
LYMPHOCYTES # BLD AUTO: 2.1 X10^3/UL (ref 1.3–2.9)
LYMPHOCYTES NFR BLD AUTO: 25.3 % (ref 21–51)
MCH RBC QN AUTO: 30.6 PG (ref 27–34)
MCHC RBC AUTO-ENTMCNC: 34.2 G/DL (ref 33–35)
MCV RBC AUTO: 89.6 FL (ref 80–100)
MONOCYTES # BLD AUTO: 0.7 X10^3/UL (ref 0.3–0.8)
MONOCYTES NFR BLD AUTO: 8.3 % (ref 0–13)
NEUTROPHILS # BLD AUTO: 5.2 X10^3/UL (ref 2.2–4.8)
NEUTROPHILS NFR BLD AUTO: 61.9 % (ref 42–75)
NITRITE UR QL STRIP.AUTO: NEGATIVE
PH UR STRIP.AUTO: 5 [PH] (ref 5–8)
PLATELET # BLD: 188 X10^3/UL (ref 150–450)
PMV BLD AUTO: 8.1 FL (ref 7.4–11)
PROT SERPL-MCNC: 7.9 G/DL (ref 6.4–8.2)
PROT UR QL STRIP.AUTO: (no result)
RBC # BLD AUTO: 4.95 X10^6/UL (ref 3.5–5.4)
RBC # UR STRIP.AUTO: (no result) /UL
RBC #/AREA URNS HPF: (no result) /HPF
SODIUM SERPL-SCNC: 133 MMOL/L (ref 136–145)
SP GR UR STRIP.AUTO: 1.02 (ref 1–1.03)
SQUAMOUS URNS QL MICRO: (no result) /HPF
TROPONIN I SERPL-MCNC: < 0.02 NG/ML (ref 0–1.5)
TROPONIN I SERPL-MCNC: < 0.02 NG/ML (ref 0–1.5)
UROBILINOGEN UR QL STRIP.AUTO: NORMAL
WBC NRBC COR # BLD AUTO: 8.3 X10^3/UL (ref 3.6–10)

## 2018-02-02 PROCEDURE — 80061 LIPID PANEL: CPT

## 2018-02-02 PROCEDURE — S0028 INJECTION, FAMOTIDINE, 20 MG: HCPCS

## 2018-02-02 PROCEDURE — 99231 SBSQ HOSP IP/OBS SF/LOW 25: CPT

## 2018-02-02 PROCEDURE — 94760 N-INVAS EAR/PLS OXIMETRY 1: CPT

## 2018-02-02 PROCEDURE — 87205 SMEAR GRAM STAIN: CPT

## 2018-02-02 PROCEDURE — 71045 X-RAY EXAM CHEST 1 VIEW: CPT

## 2018-02-02 PROCEDURE — S0179 MEGESTROL 20 MG: HCPCS

## 2018-02-02 PROCEDURE — 96365 THER/PROPH/DIAG IV INF INIT: CPT

## 2018-02-02 PROCEDURE — 70450 CT HEAD/BRAIN W/O DYE: CPT

## 2018-02-02 PROCEDURE — 80053 COMPREHEN METABOLIC PANEL: CPT

## 2018-02-02 PROCEDURE — 85025 COMPLETE CBC W/AUTO DIFF WBC: CPT

## 2018-02-02 PROCEDURE — 96374 THER/PROPH/DIAG INJ IV PUSH: CPT

## 2018-02-02 PROCEDURE — 83735 ASSAY OF MAGNESIUM: CPT

## 2018-02-02 PROCEDURE — 87070 CULTURE OTHR SPECIMN AEROBIC: CPT

## 2018-02-02 PROCEDURE — 84484 ASSAY OF TROPONIN QUANT: CPT

## 2018-02-02 PROCEDURE — 85730 THROMBOPLASTIN TIME PARTIAL: CPT

## 2018-02-02 PROCEDURE — G0378 HOSPITAL OBSERVATION PER HR: HCPCS

## 2018-02-02 PROCEDURE — 87040 BLOOD CULTURE FOR BACTERIA: CPT

## 2018-02-02 PROCEDURE — 93005 ELECTROCARDIOGRAM TRACING: CPT

## 2018-02-02 PROCEDURE — 82553 CREATINE MB FRACTION: CPT

## 2018-02-02 PROCEDURE — 36415 COLL VENOUS BLD VENIPUNCTURE: CPT

## 2018-02-02 PROCEDURE — 81001 URINALYSIS AUTO W/SCOPE: CPT

## 2018-02-02 PROCEDURE — 82550 ASSAY OF CK (CPK): CPT

## 2018-02-02 PROCEDURE — 87075 CULTR BACTERIA EXCEPT BLOOD: CPT

## 2018-02-02 PROCEDURE — 93010 ELECTROCARDIOGRAM REPORT: CPT

## 2018-02-02 PROCEDURE — 94640 AIRWAY INHALATION TREATMENT: CPT

## 2018-02-02 PROCEDURE — 87186 SC STD MICRODIL/AGAR DIL: CPT

## 2018-02-02 PROCEDURE — 85610 PROTHROMBIN TIME: CPT

## 2018-02-02 PROCEDURE — 87086 URINE CULTURE/COLONY COUNT: CPT

## 2018-02-02 PROCEDURE — 99284 EMERGENCY DEPT VISIT MOD MDM: CPT

## 2018-02-02 PROCEDURE — 82947 ASSAY GLUCOSE BLOOD QUANT: CPT

## 2018-02-02 PROCEDURE — 87077 CULTURE AEROBIC IDENTIFY: CPT

## 2018-02-02 PROCEDURE — 99218: CPT

## 2018-02-02 RX ADMIN — FAMOTIDINE SCH MLS/HR: 20 INJECTION, SOLUTION INTRAVENOUS at 22:15

## 2018-02-02 RX ADMIN — SODIUM CHLORIDE SCH ML: 9 INJECTION, SOLUTION INTRAMUSCULAR; INTRAVENOUS; SUBCUTANEOUS at 13:15

## 2018-02-02 NOTE — DR.AMS
HPI





- Time Seen


Time seen: 11:40





- PCP


Primary Care Physician: KAIN AVALOS





- HPI Comment


HPI Comment: HISTORY AS BELOW UNDER CHIEF COMPLAINT.





- Complaint


Cheif Complaint Doctors Comments: CONFUSE.


Chief Complaint:: SISTER IN LAW STATES PT. WOKE UP THIS MORNING AND WAS 

CONFUSED AND WAS "STARING INTO OUTER SPACE." PT. IS SLUGGISH IN TRIAGE AND IS 

CONFUSED. FAMILY MEMBER STATES PT. WAS C/O OF HER HEAD AND NECK HURTING THIS 

MORNING. PT. NORMALLY HAS AN UNSTEADY GAIT BECAUSE OF HER HX OF CVA.





- Reviewed


Nurses Notes Reviewed: Yes





- Source


History Provided: Family Member





- Mode of Arrival


Mode of Arrival: Wheelchair





- Timing


Onset of Chief Complaint: 02/02/18


Came On: Gradually


Symptoms: Worsening





- Duration


Duration: Constant


Duration: Days





- Quality


Quality: Decreased Alertness, Confusion





- Severity


Severity: Moderate





- Context


Recent: None


History Of: CVA, Dementia, Diabetes





- Associated Signs and Symptoms


Associated Signs and Symptoms: Left Sided Weakness, Generalized Weakness, Chest 

Pain, Change in Behavior, Confusion





PMH





- PMH


Past Medical History: Yes


Past Medical History: Anxiety, COPD, Coronary Artery Disease, CVA, Depression, 

Diabetes, GERD, Hypertension, MI


Past Surgical History: Yes


Surgical History: Bowel Resection, CABG/Valve Surgery





- Family History


History of Family Medical Conditions: Yes


Family Medical History: Diabetes Mellitus, Cancer, Coronary Artery Disease





- Social History


Does patient currently use any type of tobacco product: No


Have you used tobacco products in the last 12 months: No


Type of Tobacco Use: None


Does any household member use tobacco: No


Alcohol Use: None


Do you use any recreational Drugs:: No


Lives With: Family


Lives Where: Home





- infectious screening


In the last 2 months have you had wt loss of >10#?: NO


Have you had fever, night sweats or hemotysis?: No


Have you traveled outside the country in the last 6 months?: No


Isolation: Standard





ROS





- Review of Systems


Constitutional: Weakness, Fatigue


Eyes: negative: Eye Pain, Discharge


ENTM: negative: Ear Pain, Nose Discharge, Nose Congestion


Respiratoy: Short of Breath.  negative: Wheezing, Hemoptysis


Cardiovascular: Chest Pain


Gastrointestinal/Abdominal: negative: Abdominal Pain, Nausea, Vomiting


Genitourinary: Other (URINARY INCONTENNCE.)


Neurological: Weakness (LF SIDE)


Musculoskeletal: Joint Pain


Integumentary: Rash


Hematologic/Lymphatic: Easy Bleeding


Endocrine: negative: Flushing


Unable to Obtain Due To: Altered mental status





PE





- Vitals


Vital Signs: 


 











  Temp Pulse Resp BP BP BP Pulse Ox


 


 02/02/18 11:03  98.0 F  95 H  16  182/89    99


 


 11/25/17 20:00     155/71   155/71 


 


 11/24/17 08:00      134/61  














- General


Limitations: Altered Mental Status


General Appearance: Alert





- Head


Head Exam: Normal Inspection


Head Exam Physical: Other (NONE)





- Eyes


Eye exam: PERRL, EOMI.  negative: Scleral Icterus, Conjunctival Injection, 

Periorbital Swelling, Periorbital Tenderness


Pupils: Regular, Round: Bilateral, Reactive: Bilateral





- ENT


ENT Exam: Normal Oropharynx, Normal  External Ear Exam, TM's Normal Bilaterally


External Ear Exam: Normal External Inspection


TM/Canal Exam: Bilateral Normal


Nose Exam: Normal Nose Exam


Mouth Exam: Normal Inspection


Throat Exam: Normal Inspection





- Neck


Neck Exam: Normal Inspection





- Chest


Chest Inspection: Symmetric Chest Wall Rise





- Respiratory


Respiratory Exam: Normal Lung Sounds Bilat, Respiratory Distress


Respiratory Exam: Bilateral Wheezing, Bilateral Rhonchi, Lower Wheezing, Lower 

Rhonchi





- Cardiovascular


Cardiovascular Exam: Regular Rate, Normal Rhythm, Normal Heart Sounds





- Abdominal Exam


Abdominal Exam: Normal Bowel Sounds, Soft.  negative: Tenderness





- Back


Back Exam: Normal Inspection





- Neurological


Neurological Exam: Alert.  negative: Oriented X3


Speech: Other (CONFUSION)


Cranial Nerve Exam: Gag reflex (XI): Normal


Motor Strength - LUE: 5/5


Motor Strength - RUE: 4/5


Motor Strength - LLE: 5/5


Motor Strength - RLE: 5/5





- Psychological


Psychiatric Exam: Anxious





- Skin


Skin Exam: Erythema





MDM





- Differential Diagnosis


Metabolic: DKA, Hypernatremia, Hypoglycemia, Hyponatremia


Structural: CVA


Infectious: Sepsis, UTI





Course





- Treatment


Treatment: SEE ORDERS.





- Consultation


Consultation Comments: DISCUSS PATIENT WITH EL. HE WILL ADMIT PATIENT.





- Education/Counseling


Education/Counseling: Patient, Family, Education


Educated On: Diagnosis, Needs for Follow Up





ROR





- Labs Reviewed


Laboratory Results Reviewed?: Yes


Result Diagrams: 


 02/02/18 12:23





 02/02/18 12:23


Laboratory: 


 











WBC  8.3 X10^3/uL (3.6-10.0)   02/02/18  12:23    


 


RBC  4.95 X10^6/uL (3.5-5.4)   02/02/18  12:23    


 


Hgb  15.2 g/dL (12.0-16.0)   02/02/18  12:23    


 


Hct  44.4 % (36.0-47.0)   02/02/18  12:23    


 


MCV  89.6 fL (80.0-100.0)   02/02/18  12:23    


 


MCH  30.6 pg (27.0-34.0)   02/02/18  12:23    


 


MCHC  34.2 g/dL (33.0-35.0)   02/02/18  12:23    


 


RDW  15.0 % (11.6-16.5)   02/02/18  12:23    


 


Plt Count  188 X10^3/uL (150.0-450.0)   02/02/18  12:23    


 


MPV  8.1 fL (7.4-11.0)   02/02/18  12:23    


 


Neut %  61.9 % (42.0-75.0)   02/02/18  12:23    


 


Lymph %  25.3 % (21.0-51.0)   02/02/18  12:23    


 


Mono %  8.3 % (0.0-13.0)   02/02/18  12:23    


 


Eos %  3.8 % (0.9-2.9)  H  02/02/18  12:23    


 


Baso %  0.7 % (0.2-1.0)   02/02/18  12:23    


 


Neut #  5.2 x10^3/uL (2.2-4.8)  H  02/02/18  12:23    


 


Lymph #  2.1 X10^3/uL (1.3-2.9)   02/02/18  12:23    


 


Mono #  0.7 x10^3/uL (0.3-0.8)   02/02/18  12:23    


 


Eos #  0.3 x10^3/uL (0.0-0.2)  H  02/02/18  12:23    


 


Baso #  0.1 X10^3/uL (0.0-0.1)   02/02/18  12:23    


 


Absolute Nucleated RBC  0.4 /100WBC  02/02/18  12:23    


 


Sodium  133 mmol/L (136-145)  L  02/02/18  12:23    


 


Corrected Sodium  138 mmol/L (136-145)   02/02/18  12:23    


 


Potassium  4.1 mmol/L (3.5-5.1)   02/02/18  12:23    


 


Chloride  101 mmol/L ()   02/02/18  12:23    


 


Carbon Dioxide  21.8 mmol/L (21-32)   02/02/18  12:23    


 


BUN  17 mg/dL (7-18)   02/02/18  12:23    


 


Creatinine  1.32 mg/dL (0.55-1.02)  H  02/02/18  12:23    


 


Est GFR (MDRD) Af Amer  51  (>60)  L  02/02/18  12:23    


 


Est GFR (MDRD) Non-Af  42  (>60)  L  02/02/18  12:23    


 


Glucose  321 mg/dL (65-99)  H  02/02/18  12:23    


 


Calcium  9.5 mg/dL (8.5-10.1)   02/02/18  12:23    


 


Corrected Calcium  TNP   02/02/18  12:23    


 


Total Bilirubin  0.40 mg/dL (0.2-1.0)   02/02/18  12:23    


 


AST  16 Units/L (15-37)   02/02/18  12:23    


 


ALT  24 Units/L (12-78)   02/02/18  12:23    


 


Alkaline Phosphatase  109 Units/L ()   02/02/18  12:23    


 


Creatine Kinase  59 Units/L ()   02/02/18  12:23    


 


Troponin I  < 0.02 ng/mL (0-1.5)   02/02/18  12:23    


 


Total Protein  7.9 g/dL (6.4-8.2)   02/02/18  12:23    


 


Albumin  3.7 g/dL (3.4-5.0)   02/02/18  12:23    


 


Globulin  4.2 g/dL (2.5-4.5)   02/02/18  12:23    


 


Albumin/Globulin Ratio  0.9 Ratio (1.1-2.1)  L  02/02/18  12:23    


 


Specimen Type  Catherized urine   02/02/18  13:33    


 


Urine Color  Yellow  (YELLOW)   02/02/18  13:33    


 


Urine Appearance  Hazy  (CLEAR)   02/02/18  13:33    


 


Urine pH  5.0  (5.0 - 8.0)   02/02/18  13:33    


 


Ur Specific Gravity  1.025  (1.000-1.030)   02/02/18  13:33    


 


Urine Protein  2+  (NEGATIVE)   02/02/18  13:33    


 


Urine Glucose (UA)  2+  (NEGATIVE)   02/02/18  13:33    


 


Urine Ketones  Negative  (NEGATIVE)   02/02/18  13:33    


 


Urine Occult Blood  2+  (NEGATIVE)   02/02/18  13:33    


 


Urine Nitrite  Negative  (NEGATIVE)   02/02/18  13:33    


 


Urine Bilirubin  Negative  (NEGATIVE)   02/02/18  13:33    


 


Urine Urobilinogen  Normal  (NORMAL)   02/02/18  13:33    


 


Ur Leukocyte Esterase  2+  (NEGATIVE)   02/02/18  13:33    


 


Urine RBC  15-20 /HPF (NEGATIVE)   02/02/18  13:33    


 


Urine WBC  20-30 /HPF (NEGATIVE)   02/02/18  13:33    


 


Ur Squamous Epith Cells  Rare /HPF (NEGATIVE)   02/02/18  13:33    


 


Amorphous Sediment  2+ /HPF (NEGATIVE)   02/02/18  13:33    


 


Urine Bacteria  2+ /HPF (NEGATIVE)   02/02/18  13:33    


 


Ur Culture Indicated?  Yes/culture set up   02/02/18  13:33    














- XRAY


XRAY Interpreted by: Radiologist


XRAY Findings: REPORT NOTED





- EKG


Rhythm: NSR (EKG NOTED)





- Diagnosis


Discharge Problem: 


Mental status alteration


Qualifiers:


 Altered mental status type: disorientation Qualified Code(s): R41.0 - 

Disorientation, unspecified





UTI (urinary tract infection)


Qualifiers:


 Urinary tract infection type: site unspecified Hematuria presence: without 

hematuria Qualified Code(s): N39.0 - Urinary tract infection, site not specified








- Discharge Plan


Disposition: 09 ADMITTED AS INPATIENT


Condition: Stable





- Follow ups/Referrals





- Instructions

## 2018-02-02 NOTE — CT
HISTORY: Confusion, history of CVA



Study:  CT brain without contrast



Comparison: MRI 11/08/2017



Technique:

Multiple axial images of the brain were obtained from the skull base to the vertex without administra
tion of IV contrast.  Dose reduction techniques including Automated Exposure Control (AEC) and adjust
ment of mA and kV were utilized.



Findings: 



Normal evolutionary changes are seen involving the right MCA infarction with some developing encephal
omalacia in this region. There is atrophy and nonspecific white matter hypoattenuation likely related
 to microvascular ischemic changes.  No evidence of acute hemorrhage, midline shift, mass effect or a
bnormal extra-axial fluid collection.  The ventricular system is symmetric and nondilated.  The soft 
tissues and osseous structures are unremarkable. There is chronic right maxillary sinus disease. The 
remaining paranasal sinuses are clear.



IMPRESSION:

 

1. Normal evolutionary changes involving the recent right MCA infarction with some developing encepha
lomalacia in this region. No acute abnormality identified.

2. Chronic atrophy and microvascular ischemic changes.





Reported By:Electronically Signed by ROSA VO MD at 2/2/2018 12:50:00 PM

## 2018-02-03 LAB
ALBUMIN SERPL BCP-MCNC: 3 G/DL (ref 3.4–5)
ALP SERPL-CCNC: 90 UNITS/L (ref 46–116)
ALT SERPL W P-5'-P-CCNC: 24 UNITS/L (ref 12–78)
AST SERPL W P-5'-P-CCNC: 22 UNITS/L (ref 15–37)
BASOPHILS # BLD AUTO: 0.1 X10^3/UL (ref 0–0.1)
BASOPHILS NFR BLD AUTO: 1.2 % (ref 0.2–1)
BUN SERPL-MCNC: 15 MG/DL (ref 7–18)
CALCIUM ALBUM COR SERPL-SCNC: 140 MMOL/L (ref 136–145)
CALCIUM ALBUM COR SERPL-SCNC: 9.9 MG/DL (ref 8.5–10.1)
CALCIUM SERPL-MCNC: 9.1 MG/DL (ref 8.5–10.1)
CHLORIDE SERPL-SCNC: 106 MMOL/L (ref 98–107)
CHOLEST SERPL-MCNC: 176 MG/DL (ref 0–200)
CHOLEST/HDLC SERPL: 3.8 {RATIO} (ref 0–5)
CK MB SERPL-MCNC: 4.2 NG/ML (ref 0–4)
CK SERPL-CCNC: 56 UNITS/L (ref 26–192)
CKMB %: 7.5 % (ref ?–4)
CO2 SERPL-SCNC: 19.7 MMOL/L (ref 21–32)
CREAT SERPL-MCNC: 1.11 MG/DL (ref 0.55–1.02)
EGFR  BLACK RACES: > 60 (ref 60–?)
EOSINOPHIL # BLD AUTO: 0.2 X10^3/UL (ref 0–0.2)
EOSINOPHIL NFR BLD AUTO: 3.4 % (ref 0.9–2.9)
ERYTHROCYTE [DISTWIDTH] IN BLOOD BY AUTOMATED COUNT: 15 % (ref 11.6–16.5)
HCT VFR BLD AUTO: 39.3 % (ref 36–47)
HDLC SERPL-MCNC: 46 MG/DL (ref 40–60)
HGB BLD-MCNC: 13.6 G/DL (ref 12–16)
LYMPHOCYTES # BLD AUTO: 1 X10^3/UL (ref 1.3–2.9)
LYMPHOCYTES NFR BLD AUTO: 15.4 % (ref 21–51)
MAGNESIUM SERPL-MCNC: 1.4 MG/DL (ref 1.7–2.9)
MCH RBC QN AUTO: 30.4 PG (ref 27–34)
MCHC RBC AUTO-ENTMCNC: 34.6 G/DL (ref 33–35)
MCV RBC AUTO: 87.8 FL (ref 80–100)
MONOCYTES # BLD AUTO: 0.4 X10^3/UL (ref 0.3–0.8)
MONOCYTES NFR BLD AUTO: 6 % (ref 0–13)
NEUTROPHILS # BLD AUTO: 5 X10^3/UL (ref 2.2–4.8)
NEUTROPHILS NFR BLD AUTO: 74 % (ref 42–75)
PLATELET # BLD: 192 X10^3/UL (ref 150–450)
PMV BLD AUTO: 8 FL (ref 7.4–11)
PROT SERPL-MCNC: 6.9 G/DL (ref 6.4–8.2)
RBC # BLD AUTO: 4.47 X10^6/UL (ref 3.5–5.4)
SODIUM SERPL-SCNC: 136 MMOL/L (ref 136–145)
TRIGL SERPL-MCNC: 72 MG/DL (ref 0–150)
TROPONIN I SERPL-MCNC: < 0.02 NG/ML (ref 0–1.5)
WBC NRBC COR # BLD AUTO: 6.7 X10^3/UL (ref 3.6–10)

## 2018-02-03 RX ADMIN — CEFTRIAXONE SCH MLS/HR: 1 INJECTION, POWDER, FOR SOLUTION INTRAMUSCULAR; INTRAVENOUS at 08:48

## 2018-02-03 RX ADMIN — ALBUTEROL SULFATE SCH EA: 2.5 SOLUTION RESPIRATORY (INHALATION) at 12:40

## 2018-02-03 RX ADMIN — FAMOTIDINE SCH MLS/HR: 20 INJECTION, SOLUTION INTRAVENOUS at 08:48

## 2018-02-03 RX ADMIN — SODIUM CHLORIDE SCH: 9 INJECTION, SOLUTION INTRAMUSCULAR; INTRAVENOUS; SUBCUTANEOUS at 14:50

## 2018-02-03 RX ADMIN — BUSPIRONE HYDROCHLORIDE SCH MG: 10 TABLET ORAL at 21:03

## 2018-02-03 RX ADMIN — ALBUTEROL SULFATE SCH: 2.5 SOLUTION RESPIRATORY (INHALATION) at 18:39

## 2018-02-03 RX ADMIN — BUSPIRONE HYDROCHLORIDE SCH MG: 10 TABLET ORAL at 13:32

## 2018-02-03 RX ADMIN — SODIUM CHLORIDE SCH ML: 9 INJECTION, SOLUTION INTRAMUSCULAR; INTRAVENOUS; SUBCUTANEOUS at 06:09

## 2018-02-03 RX ADMIN — METOPROLOL TARTRATE SCH MG: 25 TABLET, FILM COATED ORAL at 21:02

## 2018-02-03 NOTE — DR.H&P
H&P





- History & Physical for Day of:


H&P Date: 02/02/18





- Chief Complaint


Chief Complaint: AMS





- Allergies


Allergies/Adverse Reactions: 


 Allergies











Allergy/AdvReac Type Severity Reaction Status Date / Time


 


No Known Drug Allergies Allergy   Verified 02/02/18 11:08














- History of Present Illness


History of Present Illness: 72 ER ADMISSION AFTER PRESENTING WITH AMS. PT HAS 

PMH OF CVA, COPD, CHF, GERD, OA, AFTT. ADMISSION LABS OBTAINED IN ED. PLAN TO 

HYDRATION, ELECTROLYTE REPLACEMENT, RESUME HOME MEDS.  BLOOD SUGAR CONTROL





- Past Medical History


Past Medical History: Anxiety, COPD, Coronary Artery Disease, CVA, Depression, 

Diabetes, GERD, Hypertension, MI


Additional Medical History: colon cancer





- Past Surgical History


Surgical History: Bowel Resection, CABG/Valve Surgery


Additional Surgical History: colon cancer resection with colostomy formation





- Family History


Family Medical History: Diabetes Mellitus, Cancer, Coronary Artery Disease





- Social History


Does patient currently use any type of tobacco product: No


Have you used tobacco products in the last 12 months: No


Type of Tobacco Use: None


Does any household member use tobacco: No


Alcohol Use: None


Drug Use: None





- Medications


Home Medications: 


 





Albuterol Neb 2.5MG/ 3Ml [ALBUTEROL NEB 2.5MG/ 3ML *] 1 vial INH TID 02/02/18 [

History Confirmed 02/02/18]


Tizanidine HCl [Zanaflex] 1 tab PO TID PRN 02/02/18 [History Confirmed 02/02/18]


Tramadol HCl [ULTRAM 50 MG *] 1 tab PO TID PRN 02/02/18 [History Confirmed 02/02 /18]











- Review of Systems


Constitutional: Weakness


Eyes: No Symptoms Reported


ENT: No Symptoms Reported


Respiratory: Cough, Shortness of Breath


Cardiovascular: No Symptoms Reported


Gastrointestinal: No Symptoms Reported


Genitourinary: No Symptoms Reported


Musculoskeletal: Back Pain


Skin: No Symptoms Reported


Neurological: No Symptoms Reported, Weakness, Confusion (EPISODES OF CONFUSION)





- Physical Exam


Vital Signs: 


 





Temperature                      97.8 F


Pulse Rate [Right Brachial]      85


Pulse Rate                       95


Respiratory Rate                 18


Blood Pressure [Right Arm]       173/73


Blood Pressure [Left Arm]        134/61


Blood Pressure                   182/89


O2 Sat by Pulse Oximetry         99








Oriented: Person


Eyes: Normal


Ear: Normal


Nose: Normal


Throat: Normal


Respiratory: RLL Diminished, LLL Diminished


Cardiovascular: negative: Edema


: Normal


Auscultation: Bowel Sounds: Increased


Tenderness: Epigastric


Skin: Decreased Turgur


Musculoskeletal: Motor Deficit, Sensory Deficit, Instability


Psychiatric: Anxiety


Affect: Anxious


Speech Pattern: Clear, Appropriate





- Assessment/Plan


(1) Mental status alteration


Qualifiers: 


   Altered mental status type: disorientation   Qualified Code(s): R41.0 - 

Disorientation, unspecified   


Status: Acute   


Plan: ADMIT, R/O SEPSIS. RESUME HOME MEDS.  GENTLE HYDRATION.  SUPPLEMENTAL O2, 

RESP THEARPY.  RESUME HOME MEDS.  CARDIAC MONITORING   





(2) UTI (urinary tract infection)


Qualifiers: 


   Urinary tract infection type: site unspecified   Hematuria presence: without 

hematuria   Qualified Code(s): N39.0 - Urinary tract infection, site not 

specified   


Status: Acute   





(3) Hyponatremia


Status: Acute   





(4) CAD (coronary artery disease)


Status: Chronic   





(5) COPD (chronic obstructive pulmonary disease)


Status: Chronic   





(6) Diabetes mellitus, type 2


Qualifiers: 


   Diabetes mellitus complication status: with hyperglycemia   Diabetes 

mellitus long term insulin use: unspecified long term insulin use status   

Qualified Code(s): E11.65 - Type 2 diabetes mellitus with hyperglycemia   


Status: Chronic   





(7) GERD (gastroesophageal reflux disease)


Status: Chronic   





(8) Hypertension


Status: Chronic

## 2018-02-04 LAB
ALBUMIN SERPL BCP-MCNC: 3.1 G/DL (ref 3.4–5)
ALP SERPL-CCNC: 86 UNITS/L (ref 46–116)
ALT SERPL W P-5'-P-CCNC: 21 UNITS/L (ref 12–78)
AST SERPL W P-5'-P-CCNC: 18 UNITS/L (ref 15–37)
BACTERIA URNS QL MICRO: (no result) /HPF
BASOPHILS # BLD AUTO: 0.1 X10^3/UL (ref 0–0.1)
BASOPHILS NFR BLD AUTO: 1.1 % (ref 0.2–1)
BILIRUB UR QL STRIP.AUTO: NEGATIVE
BUN SERPL-MCNC: 19 MG/DL (ref 7–18)
CALCIUM ALBUM COR SERPL-SCNC: 10.3 MG/DL (ref 8.5–10.1)
CALCIUM ALBUM COR SERPL-SCNC: 141 MMOL/L (ref 136–145)
CALCIUM SERPL-MCNC: 9.6 MG/DL (ref 8.5–10.1)
CHLORIDE SERPL-SCNC: 105 MMOL/L (ref 98–107)
CLARITY UR: (no result)
CO2 SERPL-SCNC: 20.7 MMOL/L (ref 21–32)
COLOR UR AUTO: YELLOW
CREAT SERPL-MCNC: 1.1 MG/DL (ref 0.55–1.02)
EGFR  BLACK RACES: > 60 (ref 60–?)
EOSINOPHIL # BLD AUTO: 0.3 X10^3/UL (ref 0–0.2)
EOSINOPHIL NFR BLD AUTO: 4.3 % (ref 0.9–2.9)
ERYTHROCYTE [DISTWIDTH] IN BLOOD BY AUTOMATED COUNT: 15 % (ref 11.6–16.5)
GLUCOSE UR QL STRIP.AUTO: (no result)
HCT VFR BLD AUTO: 39.4 % (ref 36–47)
HGB BLD-MCNC: 13.5 G/DL (ref 12–16)
KETONES UR QL STRIP.AUTO: (no result)
LEUKOCYTE ESTERASE UR QL STRIP.AUTO: (no result)
LYMPHOCYTES # BLD AUTO: 1.3 X10^3/UL (ref 1.3–2.9)
LYMPHOCYTES NFR BLD AUTO: 20.2 % (ref 21–51)
MCH RBC QN AUTO: 30.4 PG (ref 27–34)
MCHC RBC AUTO-ENTMCNC: 34.2 G/DL (ref 33–35)
MCV RBC AUTO: 88.6 FL (ref 80–100)
MONOCYTES # BLD AUTO: 0.5 X10^3/UL (ref 0.3–0.8)
MONOCYTES NFR BLD AUTO: 6.8 % (ref 0–13)
NEUTROPHILS # BLD AUTO: 4.5 X10^3/UL (ref 2.2–4.8)
NEUTROPHILS NFR BLD AUTO: 67.6 % (ref 42–75)
NITRITE UR QL STRIP.AUTO: NEGATIVE
PH UR STRIP.AUTO: 5 [PH] (ref 5–8)
PLATELET # BLD: 195 X10^3/UL (ref 150–450)
PMV BLD AUTO: 8.4 FL (ref 7.4–11)
PROT SERPL-MCNC: 6.8 G/DL (ref 6.4–8.2)
PROT UR QL STRIP.AUTO: (no result)
RBC # BLD AUTO: 4.44 X10^6/UL (ref 3.5–5.4)
RBC # UR STRIP.AUTO: (no result) /UL
RBC #/AREA URNS HPF: (no result) /HPF
SODIUM SERPL-SCNC: 138 MMOL/L (ref 136–145)
SP GR UR STRIP.AUTO: 1.02 (ref 1–1.03)
SQUAMOUS URNS QL MICRO: NEGATIVE /HPF
UROBILINOGEN UR QL STRIP.AUTO: NORMAL
WBC NRBC COR # BLD AUTO: 6.6 X10^3/UL (ref 3.6–10)
YEAST URNS QL MICRO: (no result) /HPF

## 2018-02-04 RX ADMIN — BUSPIRONE HYDROCHLORIDE SCH MG: 10 TABLET ORAL at 09:26

## 2018-02-04 RX ADMIN — ALBUTEROL SULFATE SCH EA: 2.5 SOLUTION RESPIRATORY (INHALATION) at 20:53

## 2018-02-04 RX ADMIN — CEFTRIAXONE SCH MLS/HR: 1 INJECTION, POWDER, FOR SOLUTION INTRAMUSCULAR; INTRAVENOUS at 11:05

## 2018-02-04 RX ADMIN — ALBUTEROL SULFATE SCH EA: 2.5 SOLUTION RESPIRATORY (INHALATION) at 00:44

## 2018-02-04 RX ADMIN — NYSTATIN SCH APPLIC: 100000 POWDER TOPICAL at 21:11

## 2018-02-04 RX ADMIN — METOPROLOL TARTRATE SCH MG: 25 TABLET, FILM COATED ORAL at 09:26

## 2018-02-04 RX ADMIN — ALBUTEROL SULFATE SCH EA: 2.5 SOLUTION RESPIRATORY (INHALATION) at 13:43

## 2018-02-04 RX ADMIN — ALBUTEROL SULFATE SCH: 2.5 SOLUTION RESPIRATORY (INHALATION) at 12:27

## 2018-02-04 RX ADMIN — ALBUTEROL SULFATE SCH: 2.5 SOLUTION RESPIRATORY (INHALATION) at 00:22

## 2018-02-04 RX ADMIN — ALBUTEROL SULFATE SCH: 2.5 SOLUTION RESPIRATORY (INHALATION) at 12:28

## 2018-02-04 RX ADMIN — SODIUM CHLORIDE SCH: 9 INJECTION, SOLUTION INTRAMUSCULAR; INTRAVENOUS; SUBCUTANEOUS at 06:21

## 2018-02-04 RX ADMIN — NYSTATIN SCH APPLIC: 100000 POWDER TOPICAL at 15:42

## 2018-02-04 RX ADMIN — SODIUM CHLORIDE SCH: 9 INJECTION, SOLUTION INTRAMUSCULAR; INTRAVENOUS; SUBCUTANEOUS at 02:13

## 2018-02-04 RX ADMIN — FAMOTIDINE SCH MLS/HR: 20 INJECTION, SOLUTION INTRAVENOUS at 11:18

## 2018-02-04 RX ADMIN — GLIPIZIDE SCH MG: 5 TABLET, FILM COATED, EXTENDED RELEASE ORAL at 09:27

## 2018-02-04 RX ADMIN — BUSPIRONE HYDROCHLORIDE SCH MG: 10 TABLET ORAL at 21:11

## 2018-02-04 RX ADMIN — FAMOTIDINE SCH: 20 INJECTION, SOLUTION INTRAVENOUS at 02:13

## 2018-02-04 RX ADMIN — INSULIN DETEMIR SCH UNITS: 100 INJECTION, SOLUTION SUBCUTANEOUS at 09:27

## 2018-02-04 RX ADMIN — METOPROLOL TARTRATE SCH MG: 25 TABLET, FILM COATED ORAL at 21:11

## 2018-02-04 RX ADMIN — SODIUM CHLORIDE SCH ML: 9 INJECTION, SOLUTION INTRAMUSCULAR; INTRAVENOUS; SUBCUTANEOUS at 21:31

## 2018-02-04 RX ADMIN — FAMOTIDINE SCH MLS/HR: 20 INJECTION, SOLUTION INTRAVENOUS at 21:11

## 2018-02-04 RX ADMIN — ALBUTEROL SULFATE SCH EA: 2.5 SOLUTION RESPIRATORY (INHALATION) at 17:01

## 2018-02-04 RX ADMIN — SODIUM CHLORIDE SCH ML: 9 INJECTION, SOLUTION INTRAMUSCULAR; INTRAVENOUS; SUBCUTANEOUS at 15:42

## 2018-02-04 RX ADMIN — CITALOPRAM HYDROBROMIDE SCH MG: 20 TABLET ORAL at 09:27

## 2018-02-04 RX ADMIN — CLOPIDOGREL BISULFATE SCH MG: 75 TABLET ORAL at 09:26

## 2018-02-04 RX ADMIN — HYDROCORTISONE PRN APPLIC: 10 CREAM TOPICAL at 23:56

## 2018-02-05 LAB
ALBUMIN SERPL BCP-MCNC: 3.1 G/DL (ref 3.4–5)
ALP SERPL-CCNC: 90 UNITS/L (ref 46–116)
ALT SERPL W P-5'-P-CCNC: 17 UNITS/L (ref 12–78)
AST SERPL W P-5'-P-CCNC: 17 UNITS/L (ref 15–37)
BASOPHILS # BLD AUTO: 0.1 X10^3/UL (ref 0–0.1)
BASOPHILS NFR BLD AUTO: 0.8 % (ref 0.2–1)
BUN SERPL-MCNC: 27 MG/DL (ref 7–18)
CALCIUM ALBUM COR SERPL-SCNC: 10.2 MG/DL (ref 8.5–10.1)
CALCIUM ALBUM COR SERPL-SCNC: 139 MMOL/L (ref 136–145)
CALCIUM SERPL-MCNC: 9.5 MG/DL (ref 8.5–10.1)
CHLORIDE SERPL-SCNC: 104 MMOL/L (ref 98–107)
CO2 SERPL-SCNC: 18 MMOL/L (ref 21–32)
CREAT SERPL-MCNC: 1.04 MG/DL (ref 0.55–1.02)
EGFR  BLACK RACES: > 60 (ref 60–?)
EOSINOPHIL # BLD AUTO: 0.2 X10^3/UL (ref 0–0.2)
EOSINOPHIL NFR BLD AUTO: 2.2 % (ref 0.9–2.9)
ERYTHROCYTE [DISTWIDTH] IN BLOOD BY AUTOMATED COUNT: 14.9 % (ref 11.6–16.5)
HCT VFR BLD AUTO: 40 % (ref 36–47)
HGB BLD-MCNC: 13.7 G/DL (ref 12–16)
LYMPHOCYTES # BLD AUTO: 1.2 X10^3/UL (ref 1.3–2.9)
LYMPHOCYTES NFR BLD AUTO: 15.5 % (ref 21–51)
MCH RBC QN AUTO: 30.5 PG (ref 27–34)
MCHC RBC AUTO-ENTMCNC: 34.2 G/DL (ref 33–35)
MCV RBC AUTO: 89.1 FL (ref 80–100)
MONOCYTES # BLD AUTO: 0.5 X10^3/UL (ref 0.3–0.8)
MONOCYTES NFR BLD AUTO: 6.5 % (ref 0–13)
NEUTROPHILS # BLD AUTO: 5.9 X10^3/UL (ref 2.2–4.8)
NEUTROPHILS NFR BLD AUTO: 75 % (ref 42–75)
PLATELET # BLD: 199 X10^3/UL (ref 150–450)
PMV BLD AUTO: 8.5 FL (ref 7.4–11)
PROT SERPL-MCNC: 6.9 G/DL (ref 6.4–8.2)
RBC # BLD AUTO: 4.49 X10^6/UL (ref 3.5–5.4)
SODIUM SERPL-SCNC: 136 MMOL/L (ref 136–145)
WBC NRBC COR # BLD AUTO: 7.9 X10^3/UL (ref 3.6–10)

## 2018-02-05 RX ADMIN — CLOPIDOGREL BISULFATE SCH MG: 75 TABLET ORAL at 08:58

## 2018-02-05 RX ADMIN — BUSPIRONE HYDROCHLORIDE SCH MG: 10 TABLET ORAL at 08:58

## 2018-02-05 RX ADMIN — ALBUTEROL SULFATE SCH EA: 2.5 SOLUTION RESPIRATORY (INHALATION) at 14:00

## 2018-02-05 RX ADMIN — NYSTATIN SCH APPLIC: 100000 POWDER TOPICAL at 20:41

## 2018-02-05 RX ADMIN — MEGESTROL ACETATE SCH MG: 40 TABLET ORAL at 20:36

## 2018-02-05 RX ADMIN — CITALOPRAM HYDROBROMIDE SCH MG: 20 TABLET ORAL at 08:58

## 2018-02-05 RX ADMIN — SODIUM CHLORIDE SCH: 9 INJECTION, SOLUTION INTRAMUSCULAR; INTRAVENOUS; SUBCUTANEOUS at 16:07

## 2018-02-05 RX ADMIN — SODIUM CHLORIDE SCH ML: 9 INJECTION, SOLUTION INTRAMUSCULAR; INTRAVENOUS; SUBCUTANEOUS at 22:34

## 2018-02-05 RX ADMIN — ALBUTEROL SULFATE SCH EA: 2.5 SOLUTION RESPIRATORY (INHALATION) at 05:35

## 2018-02-05 RX ADMIN — METOPROLOL TARTRATE SCH MG: 25 TABLET, FILM COATED ORAL at 20:37

## 2018-02-05 RX ADMIN — METOPROLOL TARTRATE SCH MG: 25 TABLET, FILM COATED ORAL at 08:58

## 2018-02-05 RX ADMIN — GLIPIZIDE SCH MG: 5 TABLET, FILM COATED, EXTENDED RELEASE ORAL at 08:58

## 2018-02-05 RX ADMIN — CEFTRIAXONE SCH MLS/HR: 1 INJECTION, POWDER, FOR SOLUTION INTRAMUSCULAR; INTRAVENOUS at 08:57

## 2018-02-05 RX ADMIN — OXYCODONE HYDROCHLORIDE AND ACETAMINOPHEN SCH MG: 500 TABLET ORAL at 20:36

## 2018-02-05 RX ADMIN — THERA TABS SCH TAB: TAB at 20:36

## 2018-02-05 RX ADMIN — FAMOTIDINE SCH MLS/HR: 20 INJECTION, SOLUTION INTRAVENOUS at 08:58

## 2018-02-05 RX ADMIN — INSULIN DETEMIR SCH UNITS: 100 INJECTION, SOLUTION SUBCUTANEOUS at 08:57

## 2018-02-05 RX ADMIN — ALBUTEROL SULFATE SCH EA: 2.5 SOLUTION RESPIRATORY (INHALATION) at 20:43

## 2018-02-05 RX ADMIN — SODIUM CHLORIDE SCH ML: 9 INJECTION, SOLUTION INTRAMUSCULAR; INTRAVENOUS; SUBCUTANEOUS at 05:47

## 2018-02-05 RX ADMIN — BUSPIRONE HYDROCHLORIDE SCH MG: 10 TABLET ORAL at 20:36

## 2018-02-05 RX ADMIN — ALBUTEROL SULFATE SCH EA: 2.5 SOLUTION RESPIRATORY (INHALATION) at 01:06

## 2018-02-05 RX ADMIN — NYSTATIN SCH APPLIC: 100000 POWDER TOPICAL at 09:00

## 2018-02-05 RX ADMIN — FAMOTIDINE SCH MLS/HR: 20 INJECTION, SOLUTION INTRAVENOUS at 20:35

## 2018-02-06 LAB
ALBUMIN SERPL BCP-MCNC: 3.3 G/DL (ref 3.4–5)
ALP SERPL-CCNC: 86 UNITS/L (ref 46–116)
ALT SERPL W P-5'-P-CCNC: 18 UNITS/L (ref 12–78)
AST SERPL W P-5'-P-CCNC: 14 UNITS/L (ref 15–37)
BASOPHILS # BLD AUTO: 0 X10^3/UL (ref 0–0.1)
BASOPHILS NFR BLD AUTO: 0.6 % (ref 0.2–1)
BUN SERPL-MCNC: 24 MG/DL (ref 7–18)
CALCIUM ALBUM COR SERPL-SCNC: 140 MMOL/L (ref 136–145)
CALCIUM ALBUM COR SERPL-SCNC: 9.8 MG/DL (ref 8.5–10.1)
CALCIUM SERPL-MCNC: 9.2 MG/DL (ref 8.5–10.1)
CHLORIDE SERPL-SCNC: 105 MMOL/L (ref 98–107)
CO2 SERPL-SCNC: 19.8 MMOL/L (ref 21–32)
CREAT SERPL-MCNC: 1 MG/DL (ref 0.55–1.02)
EGFR  BLACK RACES: > 60 (ref 60–?)
EOSINOPHIL # BLD AUTO: 0.1 X10^3/UL (ref 0–0.2)
EOSINOPHIL NFR BLD AUTO: 1.8 % (ref 0.9–2.9)
ERYTHROCYTE [DISTWIDTH] IN BLOOD BY AUTOMATED COUNT: 14.6 % (ref 11.6–16.5)
HCT VFR BLD AUTO: 39.9 % (ref 36–47)
HGB BLD-MCNC: 13.7 G/DL (ref 12–16)
LYMPHOCYTES # BLD AUTO: 1.2 X10^3/UL (ref 1.3–2.9)
LYMPHOCYTES NFR BLD AUTO: 15.3 % (ref 21–51)
MCH RBC QN AUTO: 30.4 PG (ref 27–34)
MCHC RBC AUTO-ENTMCNC: 34.2 G/DL (ref 33–35)
MCV RBC AUTO: 88.9 FL (ref 80–100)
MONOCYTES # BLD AUTO: 0.5 X10^3/UL (ref 0.3–0.8)
MONOCYTES NFR BLD AUTO: 6.4 % (ref 0–13)
NEUTROPHILS # BLD AUTO: 6.2 X10^3/UL (ref 2.2–4.8)
NEUTROPHILS NFR BLD AUTO: 75.9 % (ref 42–75)
PLATELET # BLD: 208 X10^3/UL (ref 150–450)
PMV BLD AUTO: 8.4 FL (ref 7.4–11)
PROT SERPL-MCNC: 7.1 G/DL (ref 6.4–8.2)
RBC # BLD AUTO: 4.49 X10^6/UL (ref 3.5–5.4)
SODIUM SERPL-SCNC: 137 MMOL/L (ref 136–145)
WBC NRBC COR # BLD AUTO: 8.2 X10^3/UL (ref 3.6–10)

## 2018-02-06 RX ADMIN — GLIPIZIDE SCH MG: 5 TABLET, FILM COATED, EXTENDED RELEASE ORAL at 09:08

## 2018-02-06 RX ADMIN — SODIUM CHLORIDE SCH: 9 INJECTION, SOLUTION INTRAMUSCULAR; INTRAVENOUS; SUBCUTANEOUS at 17:42

## 2018-02-06 RX ADMIN — NYSTATIN SCH APPLIC: 100000 POWDER TOPICAL at 21:06

## 2018-02-06 RX ADMIN — CITALOPRAM HYDROBROMIDE SCH MG: 20 TABLET ORAL at 09:10

## 2018-02-06 RX ADMIN — HYDROCORTISONE PRN APPLIC: 10 CREAM TOPICAL at 05:53

## 2018-02-06 RX ADMIN — ALBUTEROL SULFATE SCH EA: 2.5 SOLUTION RESPIRATORY (INHALATION) at 20:30

## 2018-02-06 RX ADMIN — SODIUM CHLORIDE SCH ML: 9 INJECTION, SOLUTION INTRAMUSCULAR; INTRAVENOUS; SUBCUTANEOUS at 23:37

## 2018-02-06 RX ADMIN — NYSTATIN SCH APPLIC: 100000 POWDER TOPICAL at 09:12

## 2018-02-06 RX ADMIN — SODIUM CHLORIDE SCH ML: 9 INJECTION, SOLUTION INTRAMUSCULAR; INTRAVENOUS; SUBCUTANEOUS at 05:53

## 2018-02-06 RX ADMIN — MEGESTROL ACETATE SCH MG: 40 TABLET ORAL at 21:01

## 2018-02-06 RX ADMIN — CEFTRIAXONE SCH MLS/HR: 1 INJECTION, POWDER, FOR SOLUTION INTRAMUSCULAR; INTRAVENOUS at 09:07

## 2018-02-06 RX ADMIN — METOPROLOL TARTRATE SCH MG: 25 TABLET, FILM COATED ORAL at 21:01

## 2018-02-06 RX ADMIN — MEGESTROL ACETATE SCH MG: 40 TABLET ORAL at 09:10

## 2018-02-06 RX ADMIN — ALBUTEROL SULFATE SCH EA: 2.5 SOLUTION RESPIRATORY (INHALATION) at 06:35

## 2018-02-06 RX ADMIN — BUSPIRONE HYDROCHLORIDE SCH MG: 10 TABLET ORAL at 09:08

## 2018-02-06 RX ADMIN — THERA TABS SCH TAB: TAB at 09:12

## 2018-02-06 RX ADMIN — BUSPIRONE HYDROCHLORIDE SCH MG: 10 TABLET ORAL at 21:01

## 2018-02-06 RX ADMIN — ALBUTEROL SULFATE SCH EA: 2.5 SOLUTION RESPIRATORY (INHALATION) at 13:30

## 2018-02-06 RX ADMIN — OXYCODONE HYDROCHLORIDE AND ACETAMINOPHEN SCH MG: 500 TABLET ORAL at 09:10

## 2018-02-06 RX ADMIN — CLOPIDOGREL BISULFATE SCH MG: 75 TABLET ORAL at 09:10

## 2018-02-06 RX ADMIN — INSULIN DETEMIR SCH UNITS: 100 INJECTION, SOLUTION SUBCUTANEOUS at 09:10

## 2018-02-06 RX ADMIN — FAMOTIDINE SCH MLS/HR: 20 INJECTION, SOLUTION INTRAVENOUS at 09:12

## 2018-02-06 RX ADMIN — FAMOTIDINE SCH MLS/HR: 20 INJECTION, SOLUTION INTRAVENOUS at 21:01

## 2018-02-06 RX ADMIN — METOPROLOL TARTRATE SCH MG: 25 TABLET, FILM COATED ORAL at 09:10

## 2018-02-07 VITALS — DIASTOLIC BLOOD PRESSURE: 67 MMHG | SYSTOLIC BLOOD PRESSURE: 143 MMHG

## 2018-02-07 LAB
ALBUMIN SERPL BCP-MCNC: 3.2 G/DL (ref 3.4–5)
ALP SERPL-CCNC: 87 UNITS/L (ref 46–116)
ALT SERPL W P-5'-P-CCNC: 21 UNITS/L (ref 12–78)
AST SERPL W P-5'-P-CCNC: 24 UNITS/L (ref 15–37)
BASOPHILS # BLD AUTO: 0.1 X10^3/UL (ref 0–0.1)
BASOPHILS NFR BLD AUTO: 0.9 % (ref 0.2–1)
BUN SERPL-MCNC: 28 MG/DL (ref 7–18)
CALCIUM ALBUM COR SERPL-SCNC: 138 MMOL/L (ref 136–145)
CALCIUM ALBUM COR SERPL-SCNC: 9.9 MG/DL (ref 8.5–10.1)
CALCIUM SERPL-MCNC: 9.3 MG/DL (ref 8.5–10.1)
CHLORIDE SERPL-SCNC: 104 MMOL/L (ref 98–107)
CO2 SERPL-SCNC: 17.2 MMOL/L (ref 21–32)
CREAT SERPL-MCNC: 1.05 MG/DL (ref 0.55–1.02)
EGFR  BLACK RACES: > 60 (ref 60–?)
EOSINOPHIL # BLD AUTO: 0.1 X10^3/UL (ref 0–0.2)
EOSINOPHIL NFR BLD AUTO: 1.3 % (ref 0.9–2.9)
ERYTHROCYTE [DISTWIDTH] IN BLOOD BY AUTOMATED COUNT: 15 % (ref 11.6–16.5)
HCT VFR BLD AUTO: 44 % (ref 36–47)
HGB BLD-MCNC: 14.8 G/DL (ref 12–16)
LYMPHOCYTES # BLD AUTO: 2 X10^3/UL (ref 1.3–2.9)
LYMPHOCYTES NFR BLD AUTO: 20 % (ref 21–51)
MCH RBC QN AUTO: 30.3 PG (ref 27–34)
MCHC RBC AUTO-ENTMCNC: 33.6 G/DL (ref 33–35)
MCV RBC AUTO: 90.4 FL (ref 80–100)
MONOCYTES # BLD AUTO: 1 X10^3/UL (ref 0.3–0.8)
MONOCYTES NFR BLD AUTO: 9.7 % (ref 0–13)
NEUTROPHILS # BLD AUTO: 6.7 X10^3/UL (ref 2.2–4.8)
NEUTROPHILS NFR BLD AUTO: 68.1 % (ref 42–75)
PLATELET # BLD: 221 X10^3/UL (ref 150–450)
PMV BLD AUTO: 8.3 FL (ref 7.4–11)
PROT SERPL-MCNC: 7.1 G/DL (ref 6.4–8.2)
RBC # BLD AUTO: 4.87 X10^6/UL (ref 3.5–5.4)
SODIUM SERPL-SCNC: 134 MMOL/L (ref 136–145)
WBC NRBC COR # BLD AUTO: 9.8 X10^3/UL (ref 3.6–10)

## 2018-02-07 RX ADMIN — THERA TABS SCH TAB: TAB at 09:07

## 2018-02-07 RX ADMIN — MEGESTROL ACETATE SCH MG: 40 TABLET ORAL at 09:08

## 2018-02-07 RX ADMIN — METOPROLOL TARTRATE SCH MG: 25 TABLET, FILM COATED ORAL at 09:08

## 2018-02-07 RX ADMIN — CITALOPRAM HYDROBROMIDE SCH MG: 20 TABLET ORAL at 09:07

## 2018-02-07 RX ADMIN — GLIPIZIDE SCH MG: 5 TABLET, FILM COATED, EXTENDED RELEASE ORAL at 09:08

## 2018-02-07 RX ADMIN — FAMOTIDINE SCH MLS/HR: 20 INJECTION, SOLUTION INTRAVENOUS at 09:05

## 2018-02-07 RX ADMIN — INSULIN DETEMIR SCH UNITS: 100 INJECTION, SOLUTION SUBCUTANEOUS at 09:10

## 2018-02-07 RX ADMIN — OXYCODONE HYDROCHLORIDE AND ACETAMINOPHEN SCH MG: 500 TABLET ORAL at 09:07

## 2018-02-07 RX ADMIN — ALBUTEROL SULFATE SCH EA: 2.5 SOLUTION RESPIRATORY (INHALATION) at 13:55

## 2018-02-07 RX ADMIN — NYSTATIN SCH APPLIC: 100000 POWDER TOPICAL at 09:10

## 2018-02-07 RX ADMIN — CEFTRIAXONE SCH MLS/HR: 1 INJECTION, POWDER, FOR SOLUTION INTRAMUSCULAR; INTRAVENOUS at 09:09

## 2018-02-07 RX ADMIN — CLOPIDOGREL BISULFATE SCH MG: 75 TABLET ORAL at 09:07

## 2018-02-07 RX ADMIN — SODIUM CHLORIDE SCH ML: 9 INJECTION, SOLUTION INTRAMUSCULAR; INTRAVENOUS; SUBCUTANEOUS at 05:36

## 2018-02-07 RX ADMIN — BUSPIRONE HYDROCHLORIDE SCH MG: 10 TABLET ORAL at 09:08

## 2018-02-07 RX ADMIN — SODIUM CHLORIDE SCH: 9 INJECTION, SOLUTION INTRAMUSCULAR; INTRAVENOUS; SUBCUTANEOUS at 18:00

## 2018-02-07 RX ADMIN — ALBUTEROL SULFATE SCH EA: 2.5 SOLUTION RESPIRATORY (INHALATION) at 05:12

## 2018-02-07 NOTE — PCM.PROG
Progress Note





- Progress Note for Day of


Date: 02/07/18





- Subjective


Subjective: Mister Marlow is a 72-year-old white female who is admitted with 

altered mental status, dehydration and UTI and sacral decubitus.  She is 

currently awaiting permanent nursing home placement.  Patient denies any pain 

this morning.  Discussed plan of care with patient.





- Past Medical Family Social History


Past Med/Fam/Surg Hx: No changes since H&P


Allergies: 


Allergies





No Known Drug Allergies Allergy (Verified 02/02/18 11:08)


 











- Review of Systems


ROS: No change since H&P





- Vital Signs and I&O's


Vital Signs: 


 





Temperature                      97.3 F


Pulse Rate [Left Brachial]       86


Pulse Rate [Right Brachial]      69


Pulse Rate                       69


Respiratory Rate                 18


Blood Pressure [Right Arm]       138/72


Blood Pressure [Left Arm]        149/68


Blood Pressure                   182/89


O2 Sat by Pulse Oximetry         100








Intake and Output: 


 Intake & Output











 02/05/18 02/06/18 02/07/18 02/08/18





 11:59 11:59 11:59 11:59


 


Intake Total 1177 970 570 


 


Output Total 800 1025 850 


 


Balance 377 -55 -280 














- Physical Exam


Oriented: Person


Eyes: Normal


Ear: Normal


Nose: Normal


Throat: Normal


Respiratory: Diminished


Cardiovascular: negative: Edema


: Normal


Auscultation: Bowel Sounds: Increased


Tenderness: Epigastric


Skin: Decreased Turgur, Wound (sacral decubitus stage 2)


Musculoskeletal: Motor Deficit, Sensory Deficit, Instability


Psychiatric: Anxiety


Affect: Anxious


Speech Pattern: Unclear





- Laboratory and Diagnostics


Result Diagrams: 


 02/07/18 06:19





 02/07/18 06:19


Labs: 


 





02/02/18 12:23   Blood   Blood Culture - Final


02/02/18 12:12   Blood   Blood Culture - Final


02/04/18 14:20   Sacral   Gram Stain - Final


02/04/18 14:20   Sacral   Wound Culture - Preliminary


02/04/18 14:20   Urine,Clean Catch   Urine Culture - Final


02/02/18 13:33   Urine,Catheterized   Urine Culture - Final





 Laboratory











WBC  9.8 X10^3/uL (3.6-10.0)   02/07/18  06:19    


 


RBC  4.87 X10^6/uL (3.5-5.4)   02/07/18  06:19    


 


Hgb  14.8 g/dL (12.0-16.0)   02/07/18  06:19    


 


Hct  44.0 % (36.0-47.0)   02/07/18  06:19    


 


MCV  90.4 fL (80.0-100.0)   02/07/18  06:19    


 


MCH  30.3 pg (27.0-34.0)   02/07/18  06:19    


 


MCHC  33.6 g/dL (33.0-35.0)   02/07/18  06:19    


 


RDW  15.0 % (11.6-16.5)   02/07/18  06:19    


 


Plt Count  221 X10^3/uL (150.0-450.0)   02/07/18  06:19    


 


MPV  8.3 fL (7.4-11.0)   02/07/18  06:19    


 


Neut %  68.1 % (42.0-75.0)   02/07/18  06:19    


 


Lymph %  20.0 % (21.0-51.0)  L  02/07/18  06:19    


 


Mono %  9.7 % (0.0-13.0)   02/07/18  06:19    


 


Eos %  1.3 % (0.9-2.9)   02/07/18  06:19    


 


Baso %  0.9 % (0.2-1.0)   02/07/18  06:19    


 


Neut #  6.7 x10^3/uL (2.2-4.8)  H  02/07/18  06:19    


 


Lymph #  2.0 X10^3/uL (1.3-2.9)   02/07/18  06:19    


 


Mono #  1.0 x10^3/uL (0.3-0.8)  H  02/07/18  06:19    


 


Eos #  0.1 x10^3/uL (0.0-0.2)   02/07/18  06:19    


 


Baso #  0.1 X10^3/uL (0.0-0.1)   02/07/18  06:19    


 


Absolute Nucleated RBC  0.2 /100WBC  02/07/18  06:19    


 


INR Target Range  -   02/03/18  09:00    


 


INR  0.97  (0.8-1.3)   02/03/18  09:00    


 


PTT  21.9 SECONDS (22.9-36.5)  L  02/03/18  09:00    


 


PTT Comment  -   02/03/18  09:00    


 


Sodium  134 mmol/L (136-145)  L  02/07/18  06:19    


 


Corrected Sodium  138 mmol/L (136-145)   02/07/18  06:19    


 


Potassium  5.0 mmol/L (3.5-5.1)   02/07/18  06:19    


 


Chloride  104 mmol/L ()   02/07/18  06:19    


 


Carbon Dioxide  17.2 mmol/L (21-32)  L  02/07/18  06:19    


 


BUN  28 mg/dL (7-18)  H  02/07/18  06:19    


 


Creatinine  1.05 mg/dL (0.55-1.02)  H  02/07/18  06:19    


 


Est GFR (MDRD) Af Amer  > 60  (>60)   02/07/18  06:19    


 


Est GFR (MDRD) Non-Af  55  (>60)  L  02/07/18  06:19    


 


Glucose  263 mg/dL (65-99)  H  02/07/18  06:19    


 


POC Glucose (mg/dL)  315 mg/dL (65-99)  H  02/07/18  11:15    


 


Calcium  9.3 mg/dL (8.5-10.1)   02/07/18  06:19    


 


Corrected Calcium  9.9 mg/dL (8.5-10.1)   02/07/18  06:19    


 


Magnesium  1.4 mg/dL (1.7-2.9)  L  02/03/18  09:00    


 


Total Bilirubin  0.30 mg/dL (0.2-1.0)   02/07/18  06:19    


 


AST  24 Units/L (15-37)   02/07/18  06:19    


 


ALT  21 Units/L (12-78)   02/07/18  06:19    


 


Alkaline Phosphatase  87 Units/L ()   02/07/18  06:19    


 


Creatine Kinase  56 Units/L ()   02/03/18  01:00    


 


CK-MB (CK-2)  4.2 ng/mL (0-4.0)  H*  02/03/18  01:00    


 


CK/CKMB % Calc  7.5 % (<4)   02/03/18  01:00    


 


Troponin I  < 0.02 ng/mL (0-1.5)   02/03/18  01:00    


 


Total Protein  7.1 g/dL (6.4-8.2)   02/07/18  06:19    


 


Albumin  3.2 g/dL (3.4-5.0)  L  02/07/18  06:19    


 


Globulin  3.9 g/dL (2.5-4.5)   02/07/18  06:19    


 


Albumin/Globulin Ratio  0.8 Ratio (1.1-2.1)  L  02/07/18  06:19    


 


Triglycerides  72 mg/dL (0-150)   02/03/18  09:00    


 


Cholesterol  176 mg/dL (0-200)   02/03/18  09:00    


 


LDL Cholesterol, Calc  116 mg/dL (0-100)  H  02/03/18  09:00    


 


HDL Cholesterol  46 mg/dL (40-60)   02/03/18  09:00    


 


Cholesterol/HDL Ratio  3.8  (0.0-5.0)   02/03/18  09:00    


 


Specimen Type  Catherized urine   02/04/18  14:20    


 


Urine Color  Yellow  (YELLOW)   02/04/18  14:20    


 


Urine Appearance  Cloudy  (CLEAR)   02/04/18  14:20    


 


Urine pH  5.0  (5.0 - 8.0)   02/04/18  14:20    


 


Ur Specific Gravity  1.020  (1.000-1.030)   02/04/18  14:20    


 


Urine Protein  2+  (NEGATIVE)   02/04/18  14:20    


 


Urine Glucose (UA)  3+  (NEGATIVE)   02/04/18  14:20    


 


Urine Ketones  1+  (NEGATIVE)   02/04/18  14:20    


 


Urine Occult Blood  3+  (NEGATIVE)   02/04/18  14:20    


 


Urine Nitrite  Negative  (NEGATIVE)   02/04/18  14:20    


 


Urine Bilirubin  Negative  (NEGATIVE)   02/04/18  14:20    


 


Urine Urobilinogen  Normal  (NORMAL)   02/04/18  14:20    


 


Ur Leukocyte Esterase  3+  (NEGATIVE)   02/04/18  14:20    


 


Urine RBC  5-10 /HPF (NEGATIVE)   02/04/18  14:20    


 


Urine WBC  Tntc /HPF (NEGATIVE)   02/04/18  14:20    


 


Ur Squamous Epith Cells  Negative /HPF (NEGATIVE)   02/04/18  14:20    


 


Amorphous Sediment  2+ /HPF (NEGATIVE)   02/02/18  13:33    


 


Urine Bacteria  1+ /HPF (NEGATIVE)   02/04/18  14:20    


 


Urine Yeast  Numerous /HPF (NEGATIVE)   02/04/18  14:20    


 


Ur Culture Indicated?  Yes/culture set up   02/04/18  14:20    














- Plan


(1) Mental status alteration


Status: Acute   


Qualifiers: 


   Altered mental status type: disorientation   Qualified Code(s): R41.0 - 

Disorientation, unspecified   


Plan: MONTIOR.  RESUME HOME MEDS.  GENTLE HYDRATION.  SUPPLEMENTAL O2, RESP 

THEARPY.  RESUME HOME MEDS.  CARDIAC MONITORING   





(2) UTI (urinary tract infection)


Status: Acute   


Qualifiers: 


   Urinary tract infection type: site unspecified   Hematuria presence: without 

hematuria   Qualified Code(s): N39.0 - Urinary tract infection, site not 

specified   


Plan: IV ROCEPHIN   





(3) Hyponatremia


Status: Acute   





(4) CAD (coronary artery disease)


Status: Chronic   





(5) COPD (chronic obstructive pulmonary disease)


Status: Chronic   





(6) Diabetes mellitus, type 2


Status: Chronic   


Qualifiers: 


   Diabetes mellitus complication status: with hyperglycemia   Diabetes 

mellitus long term insulin use: unspecified long term insulin use status   

Qualified Code(s): E11.65 - Type 2 diabetes mellitus with hyperglycemia   





(7) GERD (gastroesophageal reflux disease)


Status: Chronic   





(8) Hypertension


Status: Chronic   





(9) Sacral decubitus ulcer, stage II


Status: Acute   


Plan: c & s pending, wound care

## 2018-04-04 ENCOUNTER — HOSPITAL ENCOUNTER (OUTPATIENT)
Dept: HOSPITAL 67 - AMB | Age: 73
Discharge: ANOTHER HOSPITAL | End: 2018-04-04
Payer: COMMERCIAL

## 2018-04-04 DIAGNOSIS — R41.82: Primary | ICD-10-CM

## 2018-04-04 PROCEDURE — A0429 BLS-EMERGENCY: HCPCS

## 2018-04-04 PROCEDURE — A0425 GROUND MILEAGE: HCPCS

## 2018-04-21 ENCOUNTER — HOSPITAL ENCOUNTER (INPATIENT)
Dept: HOSPITAL 24 - ER | Age: 73
LOS: 3 days | Discharge: HOME HEALTH | DRG: 948 | End: 2018-04-24
Attending: INTERNAL MEDICINE | Admitting: INTERNAL MEDICINE
Payer: COMMERCIAL

## 2018-04-21 VITALS — BODY MASS INDEX: 16.6 KG/M2

## 2018-04-21 DIAGNOSIS — I10: ICD-10-CM

## 2018-04-21 DIAGNOSIS — I25.10: ICD-10-CM

## 2018-04-21 DIAGNOSIS — K21.9: ICD-10-CM

## 2018-04-21 DIAGNOSIS — J44.9: ICD-10-CM

## 2018-04-21 DIAGNOSIS — Z86.73: ICD-10-CM

## 2018-04-21 DIAGNOSIS — E87.1: ICD-10-CM

## 2018-04-21 DIAGNOSIS — E86.0: ICD-10-CM

## 2018-04-21 DIAGNOSIS — R26.89: ICD-10-CM

## 2018-04-21 DIAGNOSIS — R09.02: ICD-10-CM

## 2018-04-21 DIAGNOSIS — E87.6: ICD-10-CM

## 2018-04-21 DIAGNOSIS — Z79.4: ICD-10-CM

## 2018-04-21 DIAGNOSIS — N39.0: ICD-10-CM

## 2018-04-21 DIAGNOSIS — R41.82: Primary | ICD-10-CM

## 2018-04-21 DIAGNOSIS — R94.31: ICD-10-CM

## 2018-04-21 DIAGNOSIS — R62.7: ICD-10-CM

## 2018-04-21 DIAGNOSIS — Z85.038: ICD-10-CM

## 2018-04-21 DIAGNOSIS — E11.65: ICD-10-CM

## 2018-04-21 DIAGNOSIS — Z93.3: ICD-10-CM

## 2018-04-21 DIAGNOSIS — F41.8: ICD-10-CM

## 2018-04-21 LAB
ALBUMIN SERPL BCP-MCNC: 3.2 G/DL (ref 3.4–5)
ALP SERPL-CCNC: 70 UNITS/L (ref 46–116)
ALT SERPL W P-5'-P-CCNC: 29 UNITS/L (ref 12–78)
APAP SERPL-MCNC: 0 UG/ML (ref 10–30)
AST SERPL W P-5'-P-CCNC: 19 UNITS/L (ref 15–37)
BACTERIA URNS QL MICRO: (no result) /HPF
BASOPHILS # BLD AUTO: 0.1 X10^3/UL (ref 0–0.1)
BASOPHILS NFR BLD AUTO: 0.8 % (ref 0.2–1)
BILIRUB UR QL STRIP.AUTO: NEGATIVE
BUN SERPL-MCNC: 21 MG/DL (ref 7–18)
CALCIUM ALBUM COR SERPL-SCNC: 139 MMOL/L (ref 136–145)
CALCIUM ALBUM COR SERPL-SCNC: 8.9 MG/DL (ref 8.5–10.1)
CALCIUM SERPL-MCNC: 8.3 MG/DL (ref 8.5–10.1)
CHLORIDE SERPL-SCNC: 97 MMOL/L (ref 98–107)
CLARITY UR: (no result)
CO2 SERPL-SCNC: 16.2 MMOL/L (ref 21–32)
COLOR UR AUTO: YELLOW
CREAT SERPL-MCNC: 1.2 MG/DL (ref 0.55–1.02)
EGFR  BLACK RACES: 57 (ref 60–?)
EOSINOPHIL # BLD AUTO: 0.1 X10^3/UL (ref 0–0.2)
EOSINOPHIL NFR BLD AUTO: 0.6 % (ref 0.9–2.9)
ERYTHROCYTE [DISTWIDTH] IN BLOOD BY AUTOMATED COUNT: 14.7 % (ref 11.6–16.5)
ETHANOL SERPL-MCNC: < 3 MG/DL (ref 0–19.9)
GLUCOSE UR QL STRIP.AUTO: (no result)
HCT VFR BLD AUTO: 35.8 % (ref 36–47)
HGB BLD-MCNC: 12.2 G/DL (ref 12–16)
KETONES UR QL STRIP.AUTO: NEGATIVE
LEUKOCYTE ESTERASE UR QL STRIP.AUTO: (no result)
LYMPHOCYTES # BLD AUTO: 1.1 X10^3/UL (ref 1.3–2.9)
LYMPHOCYTES NFR BLD AUTO: 12 % (ref 21–51)
MCH RBC QN AUTO: 32.2 PG (ref 27–34)
MCHC RBC AUTO-ENTMCNC: 34 G/DL (ref 33–35)
MCV RBC AUTO: 94.5 FL (ref 80–100)
MONOCYTES # BLD AUTO: 0.5 X10^3/UL (ref 0.3–0.8)
MONOCYTES NFR BLD AUTO: 5.3 % (ref 0–13)
MUCOUS THREADS #/AREA URNS HPF: (no result) /HPF
NEUTROPHILS # BLD AUTO: 7.3 X10^3/UL (ref 2.2–4.8)
NEUTROPHILS NFR BLD AUTO: 81.3 % (ref 42–75)
NITRITE UR QL STRIP.AUTO: NEGATIVE
PH UR STRIP.AUTO: 5 [PH] (ref 5–8)
PLATELET # BLD: 233 X10^3/UL (ref 150–450)
PMV BLD AUTO: 7.9 FL (ref 7.4–11)
PROT SERPL-MCNC: 7.5 G/DL (ref 6.4–8.2)
PROT UR QL STRIP.AUTO: (no result)
RBC # BLD AUTO: 3.79 X10^6/UL (ref 3.5–5.4)
RBC # UR STRIP.AUTO: NEGATIVE /UL
RBC #/AREA URNS HPF: (no result) /HPF
SALICYLATES SERPL-MCNC: < 2.8 MG/DL (ref 2.8–20)
SODIUM SERPL-SCNC: 128 MMOL/L (ref 136–145)
SP GR UR STRIP.AUTO: 1.02 (ref 1–1.03)
UROBILINOGEN UR QL STRIP.AUTO: NORMAL
WBC NRBC COR # BLD AUTO: 9 X10^3/UL (ref 3.6–10)
YEAST URNS QL MICRO: (no result) /HPF

## 2018-04-21 PROCEDURE — 85025 COMPLETE CBC W/AUTO DIFF WBC: CPT

## 2018-04-21 PROCEDURE — 71045 X-RAY EXAM CHEST 1 VIEW: CPT

## 2018-04-21 PROCEDURE — 96367 TX/PROPH/DG ADDL SEQ IV INF: CPT

## 2018-04-21 PROCEDURE — 83735 ASSAY OF MAGNESIUM: CPT

## 2018-04-21 PROCEDURE — 96374 THER/PROPH/DIAG INJ IV PUSH: CPT

## 2018-04-21 PROCEDURE — 80307 DRUG TEST PRSMV CHEM ANLYZR: CPT

## 2018-04-21 PROCEDURE — G6040 ASSAY OF ETHANOL: HCPCS

## 2018-04-21 PROCEDURE — 96375 TX/PRO/DX INJ NEW DRUG ADDON: CPT

## 2018-04-21 PROCEDURE — 80053 COMPREHEN METABOLIC PANEL: CPT

## 2018-04-21 PROCEDURE — 81001 URINALYSIS AUTO W/SCOPE: CPT

## 2018-04-21 PROCEDURE — 93005 ELECTROCARDIOGRAM TRACING: CPT

## 2018-04-21 PROCEDURE — 82947 ASSAY GLUCOSE BLOOD QUANT: CPT

## 2018-04-21 PROCEDURE — 87040 BLOOD CULTURE FOR BACTERIA: CPT

## 2018-04-21 PROCEDURE — G0434 DRUG SCREEN MULTI DRUG CLASS: HCPCS

## 2018-04-21 PROCEDURE — 93010 ELECTROCARDIOGRAM REPORT: CPT

## 2018-04-21 PROCEDURE — 51702 INSERT TEMP BLADDER CATH: CPT

## 2018-04-21 PROCEDURE — G6039 ASSAY OF ACETAMINOPHEN: HCPCS

## 2018-04-21 PROCEDURE — G6038 ASSAY OF SALICYLATE: HCPCS

## 2018-04-21 PROCEDURE — 97535 SELF CARE MNGMENT TRAINING: CPT

## 2018-04-21 PROCEDURE — 87086 URINE CULTURE/COLONY COUNT: CPT

## 2018-04-21 PROCEDURE — S0179 MEGESTROL 20 MG: HCPCS

## 2018-04-21 PROCEDURE — 99284 EMERGENCY DEPT VISIT MOD MDM: CPT

## 2018-04-21 PROCEDURE — 83605 ASSAY OF LACTIC ACID: CPT

## 2018-04-21 PROCEDURE — 82009 KETONE BODYS QUAL: CPT

## 2018-04-21 PROCEDURE — 96365 THER/PROPH/DIAG IV INF INIT: CPT

## 2018-04-21 PROCEDURE — 84132 ASSAY OF SERUM POTASSIUM: CPT

## 2018-04-21 PROCEDURE — 36415 COLL VENOUS BLD VENIPUNCTURE: CPT

## 2018-04-21 RX ADMIN — BUSPIRONE HYDROCHLORIDE SCH MG: 10 TABLET ORAL at 15:45

## 2018-04-21 RX ADMIN — ESCITALOPRAM OXALATE SCH MG: 10 TABLET, FILM COATED ORAL at 20:23

## 2018-04-21 RX ADMIN — HYDROCORTISONE PRN APPLIC: 10 CREAM TOPICAL at 15:37

## 2018-04-21 RX ADMIN — SODIUM CHLORIDE SCH MLS/HR: 9 INJECTION, SOLUTION INTRAVENOUS at 22:19

## 2018-04-21 RX ADMIN — LORAZEPAM PRN MG: 2 INJECTION, SOLUTION INTRAMUSCULAR; INTRAVENOUS at 20:28

## 2018-04-21 RX ADMIN — BUSPIRONE HYDROCHLORIDE SCH MG: 10 TABLET ORAL at 22:19

## 2018-04-21 RX ADMIN — MIRTAZAPINE SCH MG: 15 TABLET, FILM COATED ORAL at 20:23

## 2018-04-21 RX ADMIN — SODIUM CHLORIDE SCH MLS/HR: 9 INJECTION, SOLUTION INTRAVENOUS at 15:45

## 2018-04-21 RX ADMIN — SODIUM CHLORIDE SCH MLS/HR: 900 INJECTION INTRAVENOUS at 22:19

## 2018-04-21 RX ADMIN — SODIUM CHLORIDE SCH MLS/HR: 900 INJECTION INTRAVENOUS at 13:37

## 2018-04-21 NOTE — DR.GENAD
HPI





- PCP


Primary Care Physician: AI





- Complaint/Symptoms


Chief Complaint:: FAMILY STATED SHE HAS BEEN UP ALL NIGHT YELLING AND SCREAMING.


Self Treatment fo Chief Complaint: PT WAS IN [place] [facility] 

FOR 2 WEEKS, PT WAS RELEASED 4 DAYS AGO.





- Source


History Provided: Family Member





- Mode of Arrival


Mode of Arrival: Wheelchair





- Timing


Onset of Chief Complaint: 04/20/18





PMH





- PMH


Past Medical History: Yes


Past Medical History: Anxiety, COPD, Coronary Artery Disease, CVA, Depression, 

Diabetes, GERD, Hypertension, MI


Past Medical History Comment: recent admit to [facility]


Past Surgical History: Yes


Surgical History: Bowel Resection, CABG/Valve Surgery





- Family History


History of Family Medical Conditions: Yes


Family Medical History: Diabetes Mellitus, Cancer, Coronary Artery Disease





- Social History


Does patient currently use any type of tobacco product: No


Have you used tobacco products in the last 12 months: No


Type of Tobacco Use: None


Does any household member use tobacco: No


Alcohol Use: None


Do you use any recreational Drugs:: No


Lives With: Family


Lives Where: Home





- infectious screening


In the last 2 months have you had wt loss of >10#?: NO


Have you had fever, night sweats or hemotysis?: No


Have you traveled outside the country in the last 6 months?: No


Isolation: Standard





ROS





- Review of Systems


Constitutional: Other (behavioral outbursts since d/c from [facility])


Eyes: No Symptoms Reported


ENTM: No Symptoms Reported


Respiratoy: No Symptoms Reported


Cardiovascular: No Symptoms Reported


Genitourinary: No Symptoms Reported


Neurological: Anxiety, Emotional Problems, Speech Problem (chronic speech 

impediment)


Musculoskeletal: Back Pain


Integumentary: Rash


Hematologic/Lymphatic: No Symptoms Reported


Endocrine: No Symptoms Reported


Psychiatric: See HPI.  negative: Suicidal


All Other Systems: Reviewed and Negative


Unable to Obtain Due To: Altered mental status (poor historian)





PE





- Vital Signs


Vitals: 


 





Temperature                      98.6 F


Pulse Rate                       107


Respiratory Rate                 16


Blood Pressure [Right Arm]       143/67


Blood Pressure [Left Arm]        149/68


Blood Pressure                   147/78


O2 Sat by Pulse Oximetry         99











- General


General Appearance: Alert, In No Apparent Distress, Anxious





- Head


Head Exam: Normal Inspection





- Eyes


Eye exam: Normal Appearance, PERRL





- ENT


ENT Exam: Normal Exam, Other (edentulous)





- Neck


Neck Exam: Normal Inspection, Full ROM, Trachea Midline.  negative: Tenderness, 

Meningismus, Lymphadenopathy, Thyromegaly





- Chest


Chest Inspection: Symmetric Chest Wall Rise.  negative: Tenderness





- Respiratory


Respiratory Exam: Normal Lung Sounds Bilat


Respiratory Exam: Bilateral Clear to Auscultation





- Cardiovascular


Cardiovascular Exam: Regular Rate, Normal Rhythm, Normal Heart Sounds





- Abdominal Exam


Abdominal Exam: Normal Inspection, Normal Bowel Sounds, Soft.  negative: 

Tenderness





- Extremities


Extremities Exam: Normal Inspection, Full ROM, Normal Capillary Refill.  

negative: Edema





- Neurologic


Neurological Exam: Alert, Oriented X3





- Psychiatric


Psychiatric Exam: Agitated, Anxious





ROR





- Labs Reviewed


Laboratory Results Reviewed?: Yes (lactic 2.5, glucose 548)


Result Diagrams: 


 04/21/18 10:23





 04/21/18 10:23


Laboratory: 


 











WBC  9.0 X10^3/uL (3.6-10.0)   04/21/18  10:23    


 


RBC  3.79 X10^6/uL (3.5-5.4)   04/21/18  10:23    


 


Hgb  12.2 g/dL (12.0-16.0)   04/21/18  10:23    


 


Hct  35.8 % (36.0-47.0)  L  04/21/18  10:23    


 


MCV  94.5 fL (80.0-100.0)   04/21/18  10:23    


 


MCH  32.2 pg (27.0-34.0)   04/21/18  10:23    


 


MCHC  34.0 g/dL (33.0-35.0)   04/21/18  10:23    


 


RDW  14.7 % (11.6-16.5)   04/21/18  10:23    


 


Plt Count  233 X10^3/uL (150.0-450.0)   04/21/18  10:23    


 


MPV  7.9 fL (7.4-11.0)   04/21/18  10:23    


 


Neut % (Auto)  81.3 % (42.0-75.0)  H  04/21/18  10:23    


 


Lymph % (Auto)  12.0 % (21.0-51.0)  L  04/21/18  10:23    


 


Mono % (Auto)  5.3 % (0.0-13.0)   04/21/18  10:23    


 


Eos % (Auto)  0.6 % (0.9-2.9)  L  04/21/18  10:23    


 


Baso % (Auto)  0.8 % (0.2-1.0)   04/21/18  10:23    


 


Neut # (Auto)  7.3 x10^3/uL (2.2-4.8)  H  04/21/18  10:23    


 


Lymph # (Auto)  1.1 X10^3/uL (1.3-2.9)  L  04/21/18  10:23    


 


Mono # (Auto)  0.5 x10^3/uL (0.3-0.8)   04/21/18  10:23    


 


Eos # (Auto)  0.1 x10^3/uL (0.0-0.2)   04/21/18  10:23    


 


Baso # (Auto)  0.1 X10^3/uL (0.0-0.1)   04/21/18  10:23    


 


Absolute Nucleated RBC  0.0 /100WBC  04/21/18  10:23    


 


Sodium  128 mmol/L (136-145)  L  04/21/18  10:23    


 


Corrected Sodium  139 mmol/L (136-145)   04/21/18  10:23    


 


Potassium  3.7 mmol/L (3.5-5.1)   04/21/18  10:23    


 


Chloride  97 mmol/L ()  L  04/21/18  10:23    


 


Carbon Dioxide  16.2 mmol/L (21-32)  L  04/21/18  10:23    


 


BUN  21 mg/dL (7-18)  H  04/21/18  10:23    


 


Creatinine  1.20 mg/dL (0.55-1.02)  H  04/21/18  10:23    


 


Est GFR (MDRD) Af Amer  57  (>60)  L  04/21/18  10:23    


 


Est GFR (MDRD) Non-Af  47  (>60)  L  04/21/18  10:23    


 


Glucose  548 mg/dL (65-99)  H*  04/21/18  10:23    


 


Lactic Acid  2.5 mmol/L (0.4-2.0)  H  04/21/18  12:22    


 


Calcium  8.3 mg/dL (8.5-10.1)  L  04/21/18  10:23    


 


Corrected Calcium  8.9 mg/dL (8.5-10.1)   04/21/18  10:23    


 


Total Bilirubin  0.40 mg/dL (0.2-1.0)   04/21/18  10:23    


 


AST  19 Units/L (15-37)   04/21/18  10:23    


 


ALT  29 Units/L (12-78)   04/21/18  10:23    


 


Alkaline Phosphatase  70 Units/L ()   04/21/18  10:23    


 


Total Protein  7.5 g/dL (6.4-8.2)   04/21/18  10:23    


 


Albumin  3.2 g/dL (3.4-5.0)  L  04/21/18  10:23    


 


Globulin  4.3 g/dL (2.5-4.5)   04/21/18  10:23    


 


Albumin/Globulin Ratio  0.7 Ratio (1.1-2.1)  L  04/21/18  10:23    


 


Specimen Type  Clean catch urine   04/21/18  10:40    


 


Urine Color  Yellow  (YELLOW)   04/21/18  10:40    


 


Urine Appearance  Cloudy  (CLEAR)   04/21/18  10:40    


 


Urine pH  5.0  (5.0 - 8.0)   04/21/18  10:40    


 


Ur Specific Gravity  1.020  (1.000-1.030)   04/21/18  10:40    


 


Urine Protein  2+  (NEGATIVE)   04/21/18  10:40    


 


Urine Glucose (UA)  4+  (NEGATIVE)   04/21/18  10:40    


 


Urine Ketones  Negative  (NEGATIVE)   04/21/18  10:40    


 


Urine Occult Blood  Negative  (NEGATIVE)   04/21/18  10:40    


 


Urine Nitrite  Negative  (NEGATIVE)   04/21/18  10:40    


 


Urine Bilirubin  Negative  (NEGATIVE)   04/21/18  10:40    


 


Urine Urobilinogen  Normal  (NORMAL)   04/21/18  10:40    


 


Ur Leukocyte Esterase  3+  (NEGATIVE)   04/21/18  10:40    


 


Urine RBC  3-5 /HPF (NONE SEEN)   04/21/18  10:40    


 


Urine WBC  20-30 /HPF (NONE SEEN)   04/21/18  10:40    


 


Ur Squamous Epith Cells  Not Reportable   04/21/18  10:40    


 


Urine Bacteria  4+ /HPF (NEGATIVE)   04/21/18  10:40    


 


Urine Mucus  Many /HPF (NEGATIVE)   04/21/18  10:40    


 


Urine Yeast  Moderate /HPF (NEGATIVE)   04/21/18  10:40    


 


Ur Culture Indicated?  Yes/culture set up   04/21/18  10:40    


 


Salicylates  < 2.8 mg/dL (2.8-20)  L  04/21/18  10:23    


 


Urine Opiates Screen  Negative  (NEG=<300)   04/21/18  10:40    


 


Urine Methadone Screen  Negative  (NEG=<300)   04/21/18  10:40    


 


Acetaminophen  0.0 ug/mL (10-30)  L  04/21/18  10:23    


 


Ur Barbiturates Screen  Negative  (NEG=<200)   04/21/18  10:40    


 


Ur Phencyclidine Scrn  Negative  (NEG=<25)   04/21/18  10:40    


 


Ur Amphetamines Screen  Negative  (NEG=<1000)   04/21/18  10:40    


 


U Benzodiazepines Scrn  Negative  (NEG=<200)   04/21/18  10:40    


 


Urine Cocaine Screen  Negative  (NEG=<300)   04/21/18  10:40    


 


U Marijuana (THC) Screen  Negative  (NEG=<50)   04/21/18  10:40    


 


Ethyl Alcohol mg/dL  < 3 mg/dL (0-19.9)   04/21/18  10:23    


 


Acetone, Semi-Quant  Negative  (NEGATIVE)   04/21/18  10:23    














- XRAY


XRAY Findings: pending





- EKG


Compared to prior EKG Dated: 04/21/18


Rate: 86 (no STT changes)


Axis: Normal





- Diagnosis


Discharge Problem: 


 Hyponatremia





Sepsis


Qualifiers:


 Sepsis type: sepsis due to unspecified organism Qualified Code(s): A41.9 - 

Sepsis, unspecified organism





Uncontrolled diabetes mellitus


Qualifiers:


 Diabetes mellitus type: type 2 Diabetes mellitus long term insulin use: with 

long term use Diabetes mellitus complication status: with hyperglycemia 

Qualified Code(s): E11.65 - Type 2 diabetes mellitus with hyperglycemia; Z79.4 

- Long term (current) use of insulin; Z79.4 - Long term (current) use of insulin

; Z79.4 - Long term (current) use of insulin; Z79.4 - Long term (current) use 

of insulin





Altered mental status, unspecified


Qualifiers:


 Altered mental status type: unspecified Qualified Code(s): R41.82 - Altered 

mental status, unspecified








- Discharge Plan


Disposition: 09 ADMITTED AS INPATIENT


Condition: Stable





- Follow ups/Referrals


Follow ups/Referrals: 


YVROSE CLOUD [Primary Care Provider] - 3 days





- Instructions





Additional Notes





- Additional Notes


Additional Notes: spoke with Dr Hudson.  Pt to be on sliding scale insulin, 

zosyn, IV hydration

## 2018-04-21 NOTE — RAD
Examination: Portable AP chest



History: Chest pain



Comparison reference to 418



Findings: Normal heart size with sternal wires. Essentially clear lungs and pleural spaces. Mild 
loly interstitial increase is suggested. No definite pneumothorax or pleural fluid.



Impression: No significant interval change or acute chest findings.



Reported By:Electronically Signed by AROLDO CONN MD at 4/21/2018 1:35:17 PM

## 2018-04-22 LAB
ALBUMIN SERPL BCP-MCNC: 2.7 G/DL (ref 3.4–5)
ALP SERPL-CCNC: 60 UNITS/L (ref 46–116)
ALT SERPL W P-5'-P-CCNC: 24 UNITS/L (ref 12–78)
AST SERPL W P-5'-P-CCNC: 19 UNITS/L (ref 15–37)
BASOPHILS # BLD AUTO: 0 X10^3/UL (ref 0–0.1)
BASOPHILS NFR BLD AUTO: 0.6 % (ref 0.2–1)
BUN SERPL-MCNC: 12 MG/DL (ref 7–18)
CALCIUM ALBUM COR SERPL-SCNC: 145 MMOL/L (ref 136–145)
CALCIUM ALBUM COR SERPL-SCNC: 8.8 MG/DL (ref 8.5–10.1)
CALCIUM SERPL-MCNC: 7.8 MG/DL (ref 8.5–10.1)
CHLORIDE SERPL-SCNC: 112 MMOL/L (ref 98–107)
CO2 SERPL-SCNC: 15.3 MMOL/L (ref 21–32)
CREAT SERPL-MCNC: 0.93 MG/DL (ref 0.55–1.02)
EGFR  BLACK RACES: > 60 (ref 60–?)
EOSINOPHIL # BLD AUTO: 0.2 X10^3/UL (ref 0–0.2)
EOSINOPHIL NFR BLD AUTO: 3 % (ref 0.9–2.9)
ERYTHROCYTE [DISTWIDTH] IN BLOOD BY AUTOMATED COUNT: 15.2 % (ref 11.6–16.5)
HCT VFR BLD AUTO: 32.9 % (ref 36–47)
HGB BLD-MCNC: 11.7 G/DL (ref 12–16)
LYMPHOCYTES # BLD AUTO: 1.1 X10^3/UL (ref 1.3–2.9)
LYMPHOCYTES NFR BLD AUTO: 17.2 % (ref 21–51)
MCH RBC QN AUTO: 32.7 PG (ref 27–34)
MCHC RBC AUTO-ENTMCNC: 35.4 G/DL (ref 33–35)
MCV RBC AUTO: 92.4 FL (ref 80–100)
MONOCYTES # BLD AUTO: 0.4 X10^3/UL (ref 0.3–0.8)
MONOCYTES NFR BLD AUTO: 7.3 % (ref 0–13)
NEUTROPHILS # BLD AUTO: 4.4 X10^3/UL (ref 2.2–4.8)
NEUTROPHILS NFR BLD AUTO: 71.9 % (ref 42–75)
PLATELET # BLD: 238 X10^3/UL (ref 150–450)
PMV BLD AUTO: 8 FL (ref 7.4–11)
PROT SERPL-MCNC: 6.3 G/DL (ref 6.4–8.2)
RBC # BLD AUTO: 3.56 X10^6/UL (ref 3.5–5.4)
SODIUM SERPL-SCNC: 142 MMOL/L (ref 136–145)
WBC NRBC COR # BLD AUTO: 6.2 X10^3/UL (ref 3.6–10)

## 2018-04-22 RX ADMIN — BUSPIRONE HYDROCHLORIDE SCH MG: 10 TABLET ORAL at 05:19

## 2018-04-22 RX ADMIN — MAGNESIUM SULFATE HEPTAHYDRATE PRN MLS/HR: 1 INJECTION, SOLUTION INTRAVENOUS at 23:46

## 2018-04-22 RX ADMIN — MAGNESIUM SULFATE HEPTAHYDRATE PRN MLS/HR: 1 INJECTION, SOLUTION INTRAVENOUS at 18:20

## 2018-04-22 RX ADMIN — MAGNESIUM SULFATE HEPTAHYDRATE PRN MLS/HR: 1 INJECTION, SOLUTION INTRAVENOUS at 22:21

## 2018-04-22 RX ADMIN — MIRTAZAPINE SCH MG: 15 TABLET, FILM COATED ORAL at 20:07

## 2018-04-22 RX ADMIN — MEGESTROL ACETATE SCH MG: 40 TABLET ORAL at 13:46

## 2018-04-22 RX ADMIN — HYDROCORTISONE PRN APPLIC: 10 CREAM TOPICAL at 21:00

## 2018-04-22 RX ADMIN — LORAZEPAM PRN MG: 2 INJECTION, SOLUTION INTRAMUSCULAR; INTRAVENOUS at 20:33

## 2018-04-22 RX ADMIN — MIRTAZAPINE SCH MG: 15 TABLET, FILM COATED ORAL at 20:05

## 2018-04-22 RX ADMIN — MAGNESIUM SULFATE HEPTAHYDRATE PRN MLS/HR: 1 INJECTION, SOLUTION INTRAVENOUS at 20:33

## 2018-04-22 RX ADMIN — SODIUM CHLORIDE SCH MLS/HR: 900 INJECTION INTRAVENOUS at 13:28

## 2018-04-22 RX ADMIN — MEGESTROL ACETATE SCH MG: 40 TABLET ORAL at 20:07

## 2018-04-22 RX ADMIN — HUMAN INSULIN SCH: 100 INJECTION, SOLUTION SUBCUTANEOUS at 18:08

## 2018-04-22 RX ADMIN — LORAZEPAM PRN MG: 2 INJECTION, SOLUTION INTRAMUSCULAR; INTRAVENOUS at 14:23

## 2018-04-22 RX ADMIN — BUSPIRONE HYDROCHLORIDE SCH MG: 10 TABLET ORAL at 13:28

## 2018-04-22 RX ADMIN — SODIUM CHLORIDE SCH MLS/HR: 9 INJECTION, SOLUTION INTRAVENOUS at 05:19

## 2018-04-22 RX ADMIN — SODIUM CHLORIDE SCH MLS/HR: 900 INJECTION INTRAVENOUS at 21:00

## 2018-04-22 RX ADMIN — HUMAN INSULIN SCH MLS/HR: 100 INJECTION, SOLUTION SUBCUTANEOUS at 13:28

## 2018-04-22 RX ADMIN — SODIUM CHLORIDE SCH MLS/HR: 900 INJECTION INTRAVENOUS at 05:19

## 2018-04-22 RX ADMIN — ESCITALOPRAM OXALATE SCH MG: 10 TABLET, FILM COATED ORAL at 20:05

## 2018-04-22 RX ADMIN — BUSPIRONE HYDROCHLORIDE SCH MG: 10 TABLET ORAL at 21:05

## 2018-04-22 NOTE — DR.H&P
H&P





- History & Physical for Day of:


H&P Date: 04/21/18





- Chief Complaint


Chief Complaint: AMS, CONFUSION, DEHYDRATION





- Allergies


Allergies/Adverse Reactions: 


 Allergies











Allergy/AdvReac Type Severity Reaction Status Date / Time


 


No Known Drug Allergies Allergy   Verified 04/21/18 09:43














- History of Present Illness


History of Present Illness: 72 WF ER ADMISSION AFTER FAMILY BRINGING PT TO ED 

STATING SHE WAS [facility]ENT[facility] [place]. PT VERY 

AGITATED, SCREAMING OUT ALL NIGHT. PT IS DEHYDRATED AND HYPOKALMEIC ON 

ADMISSION. PT HAS HX OF CVD, ANOREXIA, OA, HTN, COPD. PT ADMITTED FOR TREATMENT 

AND EVALUATION OF ACUTE SYMPTOMS





- Past Medical History


Past Medical History: Anxiety, COPD, Coronary Artery Disease, CVA, Depression, 

Diabetes, GERD, Hypertension, MI


Additional Medical History: colon cancer





- Past Surgical History


Surgical History: Bowel Resection, CABG/Valve Surgery


Additional Surgical History: colon cancer resection with colostomy formation





- Family History


Family Medical History: Diabetes Mellitus, Cancer, Coronary Artery Disease





- Social History


Does patient currently use any type of tobacco product: No


Have you used tobacco products in the last 12 months: No


Type of Tobacco Use: None


Does any household member use tobacco: No (UNABLE TO ANSWER)


Alcohol Use: None





- Medications


Home Medications: 


 





Buspirone HCl 10 mg [BUSPAR TAB 10 MG *] 2 tab PO TID 04/21/18 [History 

Confirmed 04/21/18]


Escitalopram Oxalate [Lexapro 20 mg] 1 tab PO HS 04/21/18 [History Confirmed 04/ 21/18]


Mirtazapine [Mirtazapine] 1 tab PO HS 04/21/18 [History Confirmed 04/21/18]


Risperidone [Risperidone] 1 tab PO HS 04/21/18 [History Confirmed 04/21/18]


Sodium Chloride 1 gm PO DAILY 04/21/18 [History Confirmed 04/21/18]


Tizanidine HCl [Zanaflex 4 mg] 1 tab PO Q8H PRN 04/21/18 [History Confirmed 04/ 21/18]











- Review of Systems


Constitutional: Weakness, Malaise


Eyes: No Symptoms Reported


ENT: No Symptoms Reported


Respiratory: Shortness of Breath


Cardiovascular: denies: Chest Pain, Edema


Gastrointestinal: Other (POOR PO INTAKE)


Genitourinary: Incontinence


Musculoskeletal: Back Pain, Leg Pain


Skin: Wound (BILATERAL HEELS)


Neurological: Weakness, Confusion





- Physical Exam


Vital Signs: 


 





Temperature                      98.3 F


Pulse Rate [Left Posterior       100


Tibial]                          


Pulse Rate [Right Brachial]      91


Pulse Rate                       107


Respiratory Rate                 16


Blood Pressure [Left Calf]       163/78


Blood Pressure [Right Arm]       167/76


Blood Pressure [Left Arm]        149/68


Blood Pressure                   147/78


O2 Sat by Pulse Oximetry         99








Oriented: Person


Eyes: Normal


Ear: Normal


Nose: Normal


Throat: Normal


Respiratory: RLL Diminished, LLL Diminished


Cardiovascular: Normal


: Normal


Auscultation: Bowel Sounds: Normal


Palpation: Normal


Tenderness: Epigastric


Skin: Decreased Turgur


Musculoskeletal: Back:Lumbar, Motor Deficit, Sensory Deficit, Instability


Psychiatric: Anxiety, Agitation


Affect: Anxious


Speech Pattern: Inappropriate





- Assessment/Plan


(1) Mental status alteration


Qualifiers: 


   Altered mental status type: unspecified   Qualified Code(s): R41.82 - 

Altered mental status, unspecified   


Status: Acute   


Plan: ADMIT, IV HYDRATION.  CORRECT ELECTROLYTE IMBALANCE.  ENCOURAGE ORAL 

INTAKE.  CONFIRM HOME MEDS, ADMISSION LABS.  CXR, BP CONTROL, FALL RISK.  CASE 

MANAGEMENT CONSULT- RECOMMEND PERMANENT NH PLACEMENT   





(2) Hyponatremia


Status: Acute   





(3) Anxiety


Status: Chronic   





(4) CAD (coronary artery disease)


Status: Chronic   





(5) COPD (chronic obstructive pulmonary disease)


Status: Chronic   





(6) GERD (gastroesophageal reflux disease)


Status: Chronic   





(7) History of CVA (cerebrovascular accident)


Status: Chronic   





(8) History of colon cancer


Status: Chronic   





(9) Hypertension


Status: Chronic

## 2018-04-23 LAB
ALBUMIN SERPL BCP-MCNC: 2.7 G/DL (ref 3.4–5)
ALP SERPL-CCNC: 57 UNITS/L (ref 46–116)
ALT SERPL W P-5'-P-CCNC: 8 UNITS/L (ref 12–78)
AST SERPL W P-5'-P-CCNC: 16 UNITS/L (ref 15–37)
BASOPHILS # BLD AUTO: 0 X10^3/UL (ref 0–0.1)
BASOPHILS NFR BLD AUTO: 0.6 % (ref 0.2–1)
BUN SERPL-MCNC: 14 MG/DL (ref 7–18)
CALCIUM ALBUM COR SERPL-SCNC: 137 MMOL/L (ref 136–145)
CALCIUM ALBUM COR SERPL-SCNC: 9.1 MG/DL (ref 8.5–10.1)
CALCIUM SERPL-MCNC: 8.1 MG/DL (ref 8.5–10.1)
CHLORIDE SERPL-SCNC: 107 MMOL/L (ref 98–107)
CO2 SERPL-SCNC: 17.2 MMOL/L (ref 21–32)
CREAT SERPL-MCNC: 1.16 MG/DL (ref 0.55–1.02)
EGFR  BLACK RACES: 59 (ref 60–?)
EOSINOPHIL # BLD AUTO: 0.2 X10^3/UL (ref 0–0.2)
EOSINOPHIL NFR BLD AUTO: 2.8 % (ref 0.9–2.9)
ERYTHROCYTE [DISTWIDTH] IN BLOOD BY AUTOMATED COUNT: 15.5 % (ref 11.6–16.5)
HCT VFR BLD AUTO: 32.8 % (ref 36–47)
HGB BLD-MCNC: 11.4 G/DL (ref 12–16)
LYMPHOCYTES # BLD AUTO: 1.3 X10^3/UL (ref 1.3–2.9)
LYMPHOCYTES NFR BLD AUTO: 15.2 % (ref 21–51)
MAGNESIUM SERPL-MCNC: 3 MG/DL (ref 1.7–2.9)
MCH RBC QN AUTO: 32.2 PG (ref 27–34)
MCHC RBC AUTO-ENTMCNC: 34.7 G/DL (ref 33–35)
MCV RBC AUTO: 92.9 FL (ref 80–100)
MONOCYTES # BLD AUTO: 0.6 X10^3/UL (ref 0.3–0.8)
MONOCYTES NFR BLD AUTO: 7.2 % (ref 0–13)
NEUTROPHILS # BLD AUTO: 6.2 X10^3/UL (ref 2.2–4.8)
NEUTROPHILS NFR BLD AUTO: 74.2 % (ref 42–75)
PLATELET # BLD: 255 X10^3/UL (ref 150–450)
PMV BLD AUTO: 7.6 FL (ref 7.4–11)
PROT SERPL-MCNC: 6.8 G/DL (ref 6.4–8.2)
RBC # BLD AUTO: 3.53 X10^6/UL (ref 3.5–5.4)
SODIUM SERPL-SCNC: 135 MMOL/L (ref 136–145)
WBC NRBC COR # BLD AUTO: 8.3 X10^3/UL (ref 3.6–10)

## 2018-04-23 RX ADMIN — HUMAN INSULIN SCH MLS/HR: 100 INJECTION, SOLUTION SUBCUTANEOUS at 11:49

## 2018-04-23 RX ADMIN — LORAZEPAM PRN MG: 2 INJECTION, SOLUTION INTRAMUSCULAR; INTRAVENOUS at 20:44

## 2018-04-23 RX ADMIN — ESCITALOPRAM OXALATE SCH MG: 10 TABLET, FILM COATED ORAL at 20:15

## 2018-04-23 RX ADMIN — SODIUM CHLORIDE SCH MLS/HR: 900 INJECTION INTRAVENOUS at 05:15

## 2018-04-23 RX ADMIN — LORAZEPAM PRN MG: 2 INJECTION, SOLUTION INTRAMUSCULAR; INTRAVENOUS at 13:04

## 2018-04-23 RX ADMIN — BUSPIRONE HYDROCHLORIDE SCH MG: 10 TABLET ORAL at 05:15

## 2018-04-23 RX ADMIN — HUMAN INSULIN SCH: 100 INJECTION, SOLUTION SUBCUTANEOUS at 03:54

## 2018-04-23 RX ADMIN — MEGESTROL ACETATE SCH MG: 40 TABLET ORAL at 09:28

## 2018-04-23 RX ADMIN — MIRTAZAPINE SCH MG: 15 TABLET, FILM COATED ORAL at 20:15

## 2018-04-23 RX ADMIN — SODIUM CHLORIDE SCH MLS/HR: 900 INJECTION INTRAVENOUS at 13:06

## 2018-04-23 RX ADMIN — SODIUM CHLORIDE SCH MLS/HR: 900 INJECTION INTRAVENOUS at 21:40

## 2018-04-23 RX ADMIN — BUSPIRONE HYDROCHLORIDE SCH MG: 10 TABLET ORAL at 21:40

## 2018-04-23 RX ADMIN — BUSPIRONE HYDROCHLORIDE SCH MG: 10 TABLET ORAL at 13:06

## 2018-04-23 RX ADMIN — MEGESTROL ACETATE SCH MG: 40 TABLET ORAL at 20:16

## 2018-04-23 RX ADMIN — HUMAN INSULIN SCH: 100 INJECTION, SOLUTION SUBCUTANEOUS at 20:21

## 2018-04-24 VITALS — SYSTOLIC BLOOD PRESSURE: 176 MMHG | DIASTOLIC BLOOD PRESSURE: 70 MMHG

## 2018-04-24 LAB
ALBUMIN SERPL BCP-MCNC: 2.4 G/DL (ref 3.4–5)
ALP SERPL-CCNC: 50 UNITS/L (ref 46–116)
ALT SERPL W P-5'-P-CCNC: 18 UNITS/L (ref 12–78)
AST SERPL W P-5'-P-CCNC: 12 UNITS/L (ref 15–37)
BASOPHILS # BLD AUTO: 0.1 X10^3/UL (ref 0–0.1)
BASOPHILS NFR BLD AUTO: 1.1 % (ref 0.2–1)
BUN SERPL-MCNC: 13 MG/DL (ref 7–18)
CALCIUM ALBUM COR SERPL-SCNC: 142 MMOL/L (ref 136–145)
CALCIUM ALBUM COR SERPL-SCNC: 8.9 MG/DL (ref 8.5–10.1)
CALCIUM SERPL-MCNC: 7.6 MG/DL (ref 8.5–10.1)
CHLORIDE SERPL-SCNC: 111 MMOL/L (ref 98–107)
CO2 SERPL-SCNC: 15.7 MMOL/L (ref 21–32)
CREAT SERPL-MCNC: 1.16 MG/DL (ref 0.55–1.02)
EGFR  BLACK RACES: 59 (ref 60–?)
EOSINOPHIL # BLD AUTO: 0.2 X10^3/UL (ref 0–0.2)
EOSINOPHIL NFR BLD AUTO: 2.9 % (ref 0.9–2.9)
ERYTHROCYTE [DISTWIDTH] IN BLOOD BY AUTOMATED COUNT: 15.5 % (ref 11.6–16.5)
HCT VFR BLD AUTO: 31.2 % (ref 36–47)
HGB BLD-MCNC: 10.7 G/DL (ref 12–16)
LYMPHOCYTES # BLD AUTO: 1.7 X10^3/UL (ref 1.3–2.9)
LYMPHOCYTES NFR BLD AUTO: 23.5 % (ref 21–51)
MCH RBC QN AUTO: 32.2 PG (ref 27–34)
MCHC RBC AUTO-ENTMCNC: 34.4 G/DL (ref 33–35)
MCV RBC AUTO: 93.8 FL (ref 80–100)
MONOCYTES # BLD AUTO: 0.5 X10^3/UL (ref 0.3–0.8)
MONOCYTES NFR BLD AUTO: 7.2 % (ref 0–13)
NEUTROPHILS # BLD AUTO: 4.6 X10^3/UL (ref 2.2–4.8)
NEUTROPHILS NFR BLD AUTO: 65.3 % (ref 42–75)
PLATELET # BLD: 237 X10^3/UL (ref 150–450)
PMV BLD AUTO: 7.7 FL (ref 7.4–11)
PROT SERPL-MCNC: 5.8 G/DL (ref 6.4–8.2)
RBC # BLD AUTO: 3.33 X10^6/UL (ref 3.5–5.4)
SODIUM SERPL-SCNC: 139 MMOL/L (ref 136–145)
WBC NRBC COR # BLD AUTO: 7.1 X10^3/UL (ref 3.6–10)

## 2018-04-24 RX ADMIN — SODIUM CHLORIDE SCH MLS/HR: 900 INJECTION INTRAVENOUS at 13:07

## 2018-04-24 RX ADMIN — LORAZEPAM PRN MG: 2 INJECTION, SOLUTION INTRAMUSCULAR; INTRAVENOUS at 08:59

## 2018-04-24 RX ADMIN — MEGESTROL ACETATE SCH MG: 40 TABLET ORAL at 08:25

## 2018-04-24 RX ADMIN — LORAZEPAM PRN MG: 2 INJECTION, SOLUTION INTRAMUSCULAR; INTRAVENOUS at 12:52

## 2018-04-24 RX ADMIN — HUMAN INSULIN SCH: 100 INJECTION, SOLUTION SUBCUTANEOUS at 11:04

## 2018-04-24 RX ADMIN — SODIUM CHLORIDE SCH MLS/HR: 900 INJECTION INTRAVENOUS at 05:24

## 2018-04-24 RX ADMIN — BUSPIRONE HYDROCHLORIDE SCH MG: 10 TABLET ORAL at 05:23

## 2018-04-24 RX ADMIN — HUMAN INSULIN SCH MLS/HR: 100 INJECTION, SOLUTION SUBCUTANEOUS at 03:00

## 2018-04-24 RX ADMIN — BUSPIRONE HYDROCHLORIDE SCH MG: 10 TABLET ORAL at 13:07
